# Patient Record
Sex: FEMALE | Race: WHITE | NOT HISPANIC OR LATINO | Employment: FULL TIME | URBAN - METROPOLITAN AREA
[De-identification: names, ages, dates, MRNs, and addresses within clinical notes are randomized per-mention and may not be internally consistent; named-entity substitution may affect disease eponyms.]

---

## 2017-03-09 ENCOUNTER — COMMUNICATION - HEALTHEAST (OUTPATIENT)
Dept: FAMILY MEDICINE | Facility: CLINIC | Age: 47
End: 2017-03-09

## 2017-09-05 ENCOUNTER — RECORDS - HEALTHEAST (OUTPATIENT)
Dept: ADMINISTRATIVE | Facility: OTHER | Age: 47
End: 2017-09-05

## 2018-01-03 ENCOUNTER — TRANSFERRED RECORDS (OUTPATIENT)
Dept: HEALTH INFORMATION MANAGEMENT | Facility: CLINIC | Age: 48
End: 2018-01-03

## 2018-01-03 ENCOUNTER — OFFICE VISIT - HEALTHEAST (OUTPATIENT)
Dept: FAMILY MEDICINE | Facility: CLINIC | Age: 48
End: 2018-01-03

## 2018-01-03 DIAGNOSIS — F32.A DEPRESSION, UNSPECIFIED DEPRESSION TYPE: ICD-10-CM

## 2018-01-03 DIAGNOSIS — M25.512 CHRONIC LEFT SHOULDER PAIN: ICD-10-CM

## 2018-01-03 DIAGNOSIS — G89.29 CHRONIC LEFT SHOULDER PAIN: ICD-10-CM

## 2018-01-03 DIAGNOSIS — Z00.00 ROUTINE GENERAL MEDICAL EXAMINATION AT A HEALTH CARE FACILITY: ICD-10-CM

## 2018-01-03 DIAGNOSIS — Z12.31 VISIT FOR SCREENING MAMMOGRAM: ICD-10-CM

## 2018-01-03 LAB
CHOLEST SERPL-MCNC: 189 MG/DL
FASTING STATUS PATIENT QL REPORTED: YES
FASTING STATUS PATIENT QL REPORTED: YES
GLUCOSE BLD-MCNC: 90 MG/DL (ref 70–99)
HDLC SERPL-MCNC: 83 MG/DL
HGB BLD-MCNC: 15.1 G/DL (ref 12–16)
HPV ABSTRACT: NORMAL
LDLC SERPL CALC-MCNC: 86 MG/DL
TRIGL SERPL-MCNC: 100 MG/DL

## 2018-01-03 ASSESSMENT — MIFFLIN-ST. JEOR: SCORE: 1504.57

## 2018-01-08 LAB
HUMAN PAPILLOMA VIRUS 16 DNA: NEGATIVE
HUMAN PAPILLOMA VIRUS 18 DNA: NEGATIVE
HUMAN PAPILLOMA VIRUS FINAL DIAGNOSIS: NORMAL
HUMAN PAPILLOMA VIRUS OTHER HR: NEGATIVE
SPECIMEN DESCRIPTION: NORMAL

## 2018-01-09 LAB
BKR LAB AP ABNORMAL BLEEDING: NO
BKR LAB AP BIRTH CONTROL/HORMONES: NORMAL
BKR LAB AP CERVICAL APPEARANCE: NORMAL
BKR LAB AP GYN ADEQUACY: NORMAL
BKR LAB AP GYN INTERPRETATION: NORMAL
BKR LAB AP HPV REFLEX: NORMAL
BKR LAB AP LMP: NORMAL
BKR LAB AP PATIENT STATUS: NORMAL
BKR LAB AP PREVIOUS ABNORMAL: NORMAL
BKR LAB AP PREVIOUS NORMAL: 2013
HIGH RISK?: NO
PATH REPORT.COMMENTS IMP SPEC: NORMAL
RESULT FLAG (HE HISTORICAL CONVERSION): NORMAL

## 2018-01-13 ENCOUNTER — HOSPITAL ENCOUNTER (OUTPATIENT)
Dept: MAMMOGRAPHY | Facility: HOSPITAL | Age: 48
Discharge: HOME | End: 2018-01-13
Attending: FAMILY MEDICINE

## 2018-01-13 DIAGNOSIS — Z12.31 VISIT FOR SCREENING MAMMOGRAM: ICD-10-CM

## 2018-01-13 LAB — PAP-ABSTRACT: NORMAL

## 2018-05-30 ENCOUNTER — COMMUNICATION - HEALTHEAST (OUTPATIENT)
Dept: SCHEDULING | Facility: CLINIC | Age: 48
End: 2018-05-30

## 2018-07-22 ENCOUNTER — COMMUNICATION - HEALTHEAST (OUTPATIENT)
Dept: FAMILY MEDICINE | Facility: CLINIC | Age: 48
End: 2018-07-22

## 2018-10-29 ENCOUNTER — COMMUNICATION - HEALTHEAST (OUTPATIENT)
Dept: FAMILY MEDICINE | Facility: CLINIC | Age: 48
End: 2018-10-29

## 2018-10-29 DIAGNOSIS — F32.A DEPRESSION, UNSPECIFIED DEPRESSION TYPE: ICD-10-CM

## 2019-01-23 ENCOUNTER — COMMUNICATION - HEALTHEAST (OUTPATIENT)
Dept: FAMILY MEDICINE | Facility: CLINIC | Age: 49
End: 2019-01-23

## 2019-01-23 ENCOUNTER — OFFICE VISIT - HEALTHEAST (OUTPATIENT)
Dept: FAMILY MEDICINE | Facility: CLINIC | Age: 49
End: 2019-01-23

## 2019-01-23 DIAGNOSIS — Z12.11 SCREEN FOR COLON CANCER: ICD-10-CM

## 2019-01-23 DIAGNOSIS — F32.A DEPRESSION, UNSPECIFIED DEPRESSION TYPE: ICD-10-CM

## 2019-01-23 DIAGNOSIS — Z00.00 HEALTHCARE MAINTENANCE: ICD-10-CM

## 2019-01-23 DIAGNOSIS — Z12.31 VISIT FOR SCREENING MAMMOGRAM: ICD-10-CM

## 2019-01-23 DIAGNOSIS — Z12.11 SCREENING FOR COLON CANCER: ICD-10-CM

## 2019-01-23 LAB
CHOLEST SERPL-MCNC: 164 MG/DL
FASTING STATUS PATIENT QL REPORTED: YES
HBA1C MFR BLD: 5.2 % (ref 3.5–6)
HDLC SERPL-MCNC: 73 MG/DL
HGB BLD-MCNC: 15 G/DL (ref 12–16)
LDLC SERPL CALC-MCNC: 72 MG/DL
TRIGL SERPL-MCNC: 97 MG/DL
TSH SERPL DL<=0.005 MIU/L-ACNC: 1.98 UIU/ML (ref 0.3–5)

## 2019-01-23 ASSESSMENT — MIFFLIN-ST. JEOR: SCORE: 1503.71

## 2019-01-30 ENCOUNTER — AMBULATORY - HEALTHEAST (OUTPATIENT)
Dept: NURSING | Facility: CLINIC | Age: 49
End: 2019-01-30

## 2019-02-06 ENCOUNTER — HOSPITAL ENCOUNTER (OUTPATIENT)
Dept: MAMMOGRAPHY | Facility: CLINIC | Age: 49
Discharge: HOME | End: 2019-02-06
Attending: FAMILY MEDICINE

## 2019-02-06 DIAGNOSIS — Z12.31 VISIT FOR SCREENING MAMMOGRAM: ICD-10-CM

## 2019-02-28 ASSESSMENT — MIFFLIN-ST. JEOR
SCORE: 1499.46
SCORE: 1499.46

## 2019-03-06 ENCOUNTER — ANESTHESIA - HEALTHEAST (OUTPATIENT)
Dept: SURGERY | Facility: AMBULATORY SURGERY CENTER | Age: 49
End: 2019-03-06

## 2019-03-07 ENCOUNTER — HOSPITAL ENCOUNTER (OUTPATIENT)
Dept: SURGERY | Facility: AMBULATORY SURGERY CENTER | Age: 49
Discharge: HOME | End: 2019-03-07
Attending: SURGERY | Admitting: SURGERY
Payer: COMMERCIAL

## 2019-03-07 ENCOUNTER — SURGERY - HEALTHEAST (OUTPATIENT)
Dept: SURGERY | Facility: AMBULATORY SURGERY CENTER | Age: 49
End: 2019-03-07

## 2019-03-07 ENCOUNTER — TRANSFERRED RECORDS (OUTPATIENT)
Dept: HEALTH INFORMATION MANAGEMENT | Facility: CLINIC | Age: 49
End: 2019-03-07

## 2019-03-07 LAB
DIPSTICK EXPIRATION DATE - HISTORICAL: NORMAL
DIPSTICK LOT NUMBER - HISTORICAL: NORMAL
POC PREG URINE (HCG) HE - HISTORICAL: NEGATIVE
POC SPECIFIC GRAVITY, URINE - HISTORICAL: NORMAL
POCT KIT EXPIRATION DATE - HISTORICAL: NORMAL
POCT KIT LOT NUMBER HE - HISTORICAL: NORMAL
POCT NEGATIVE CONTROL HE - HISTORICAL: NORMAL
POCT POSITIVE CONTROL HE - HISTORICAL: NORMAL

## 2019-03-07 ASSESSMENT — MIFFLIN-ST. JEOR
SCORE: 1499.46
SCORE: 1499.46

## 2019-04-03 ENCOUNTER — COMMUNICATION - HEALTHEAST (OUTPATIENT)
Dept: FAMILY MEDICINE | Facility: CLINIC | Age: 49
End: 2019-04-03

## 2019-04-03 ENCOUNTER — AMBULATORY - HEALTHEAST (OUTPATIENT)
Dept: NURSING | Facility: CLINIC | Age: 49
End: 2019-04-03

## 2019-04-03 DIAGNOSIS — Z00.00 HEALTHCARE MAINTENANCE: ICD-10-CM

## 2019-05-31 ENCOUNTER — COMMUNICATION - HEALTHEAST (OUTPATIENT)
Dept: FAMILY MEDICINE | Facility: CLINIC | Age: 49
End: 2019-05-31

## 2019-06-12 ENCOUNTER — OFFICE VISIT - HEALTHEAST (OUTPATIENT)
Dept: FAMILY MEDICINE | Facility: CLINIC | Age: 49
End: 2019-06-12

## 2019-06-12 DIAGNOSIS — D22.9 BENIGN NEVUS: ICD-10-CM

## 2019-06-12 DIAGNOSIS — S93.402A SPRAIN OF LEFT ANKLE, UNSPECIFIED LIGAMENT, INITIAL ENCOUNTER: ICD-10-CM

## 2019-06-12 ASSESSMENT — MIFFLIN-ST. JEOR: SCORE: 1519.03

## 2020-02-18 ENCOUNTER — OFFICE VISIT - HEALTHEAST (OUTPATIENT)
Dept: FAMILY MEDICINE | Facility: CLINIC | Age: 50
End: 2020-02-18

## 2020-02-18 DIAGNOSIS — Z12.31 VISIT FOR SCREENING MAMMOGRAM: ICD-10-CM

## 2020-02-18 DIAGNOSIS — F32.A DEPRESSION, UNSPECIFIED DEPRESSION TYPE: ICD-10-CM

## 2020-02-18 DIAGNOSIS — Z00.00 ROUTINE GENERAL MEDICAL EXAMINATION AT A HEALTH CARE FACILITY: ICD-10-CM

## 2020-02-18 DIAGNOSIS — M79.645 PAIN OF FINGER OF LEFT HAND: ICD-10-CM

## 2020-02-18 LAB
CHOLEST SERPL-MCNC: 158 MG/DL
FASTING STATUS PATIENT QL REPORTED: YES
FASTING STATUS PATIENT QL REPORTED: YES
GLUCOSE BLD-MCNC: 99 MG/DL (ref 70–99)
HDLC SERPL-MCNC: 71 MG/DL
LDLC SERPL CALC-MCNC: 72 MG/DL
TRIGL SERPL-MCNC: 74 MG/DL

## 2020-02-18 ASSESSMENT — ANXIETY QUESTIONNAIRES
6. BECOMING EASILY ANNOYED OR IRRITABLE: MORE THAN HALF THE DAYS
2. NOT BEING ABLE TO STOP OR CONTROL WORRYING: NOT AT ALL
IF YOU CHECKED OFF ANY PROBLEMS ON THIS QUESTIONNAIRE, HOW DIFFICULT HAVE THESE PROBLEMS MADE IT FOR YOU TO DO YOUR WORK, TAKE CARE OF THINGS AT HOME, OR GET ALONG WITH OTHER PEOPLE: NOT DIFFICULT AT ALL
4. TROUBLE RELAXING: NOT AT ALL
7. FEELING AFRAID AS IF SOMETHING AWFUL MIGHT HAPPEN: NOT AT ALL
GAD7 TOTAL SCORE: 3
5. BEING SO RESTLESS THAT IT IS HARD TO SIT STILL: NOT AT ALL
3. WORRYING TOO MUCH ABOUT DIFFERENT THINGS: NOT AT ALL
1. FEELING NERVOUS, ANXIOUS, OR ON EDGE: SEVERAL DAYS

## 2020-02-18 ASSESSMENT — PATIENT HEALTH QUESTIONNAIRE - PHQ9: SUM OF ALL RESPONSES TO PHQ QUESTIONS 1-9: 7

## 2020-02-18 ASSESSMENT — MIFFLIN-ST. JEOR: SCORE: 1510.24

## 2020-06-08 ENCOUNTER — HOSPITAL ENCOUNTER (OUTPATIENT)
Dept: MAMMOGRAPHY | Facility: CLINIC | Age: 50
Discharge: HOME | End: 2020-06-08
Attending: FAMILY MEDICINE

## 2020-06-08 ENCOUNTER — TRANSFERRED RECORDS (OUTPATIENT)
Dept: HEALTH INFORMATION MANAGEMENT | Facility: CLINIC | Age: 50
End: 2020-06-08

## 2020-06-08 DIAGNOSIS — Z12.31 VISIT FOR SCREENING MAMMOGRAM: ICD-10-CM

## 2020-07-07 ENCOUNTER — VIRTUAL VISIT (OUTPATIENT)
Dept: FAMILY MEDICINE | Facility: OTHER | Age: 50
End: 2020-07-07

## 2020-07-08 ENCOUNTER — AMBULATORY - HEALTHEAST (OUTPATIENT)
Dept: FAMILY MEDICINE | Facility: CLINIC | Age: 50
End: 2020-07-08

## 2020-07-08 DIAGNOSIS — Z20.822 SUSPECTED COVID-19 VIRUS INFECTION: ICD-10-CM

## 2020-07-09 NOTE — PROGRESS NOTES
"Date: 2020 08:58:22  Clinician: Gerber Barajas  Clinician NPI: [id number]  Patient: Myrna Bernstein  Patient : 1970  Patient Address: [address], [place] [address]  Patient Phone: [phone]  Visit Protocol: URI  Patient Summary:  Myrna is a 50 year old ( : 1970 ) female who initiated a Visit for COVID-19 (Coronavirus) evaluation and screening. When asked the question \"Please sign me up to receive news, health information and promotions from [employer].\", Myrna responded \"No\".    Myrna states her symptoms started gradually 3-4 days ago. After her symptoms started, they improved and then got worse again.   Her symptoms consist of malaise, a headache, and chills. Myrna also feels feverish.   Symptom details     Temperature: Her current temperature is 99.8 degrees Fahrenheit.     Headache: She states the headache is mild (1-3 on a 10 point pain scale).      Myrna denies having wheezing, nausea, teeth pain, ageusia, diarrhea, vomiting, rhinitis, ear pain, sore throat, myalgias, anosmia, facial pain or pressure, cough, and nasal congestion. She also denies having recent facial or sinus surgery in the past 60 days and taking antibiotic medication in the past month. She is not experiencing dyspnea.   Precipitating events  She has not recently been exposed to someone with influenza. Myrna has been in close contact with the following high risk individuals: children under the age of 5.   Pertinent COVID-19 (Coronavirus) information  In the past 14 days, Myrna has not worked in a congregate living setting.   She does not work or volunteer as healthcare worker or a  and does not work or volunteer in a healthcare facility.   Myrna also has not lived in a congregate living setting in the past 14 days. She does not live with a healthcare worker.   Myrna has not had a close contact with a laboratory-confirmed COVID-19 patient within 14 days of symptom onset.   Pertinent medical history  " Myrna had 1 sinus infection within the past year.   Myrna does not get yeast infections when she takes antibiotics.   Myrna needs a return to work/school note.   Weight: 190 lbs   Myrna does not smoke or use smokeless tobacco.   Additional information as reported by the patient (free text): About 2 weeks ago, the left side of my neck was sore- the lymph nodes did not feel enlarged but very tender, that moved to the right side a few days ago. My first temp was Sat. night 100, fine in morning. Sunday evening temp 100.7, Monday morning fine. Monday evening 100, this morning varies between 99- 99.9, slight headache   Weight: 190 lbs    MEDICATIONS: venlafaxine oral, ALLERGIES: erythromycin base  Clinician Response:  Dear Myrna,   Your symptoms show that you may have coronavirus (COVID-19). This illness can cause fever, cough and trouble breathing. Many people get a mild case and get better on their own. Some people can get very sick.  What should I do?  We would like to test you for this virus.   1. Please call [phone] to schedule your visit. Explain that you were referred by [facility] to have a COVID-19 test. Be [facility].  The following will serve as your written order for this COVID Test, ordered by me, for the indication of suspected COVID [Z20.828]: The test will be ordered in [employer], our electronic health record, after you are scheduled. It will show as ordered and authorized by Jackson Reeder MD.  Order: COVID-19 (Coronavirus) PCR for SYMPTOMATIC testing from [facility].      2. When it's time for your COVID test:  Stay at least 6 feet away from others. (If someone will drive you to your test, stay in the backseat, as far away from the  as you can.)   Cover your mouth and nose with a mask, tissue or washcloth.  Go straight to the testing site. Don't make any stops on the way there or back.      3.Starting now: Stay home and away from others (self-isolate) until:   You've had  "no fever---and no medicine that reduces fever---for 3 full days (72 hours). And...   Your other symptoms have gotten better. For example, your cough or breathing has improved. And...   At least 10 days have passed since your symptoms started.       During this time, don't leave the house except for testing or medical care.   Stay in your own room, even for meals. Use your own bathroom if you can.   Stay away from others in your home. No hugging, kissing or shaking hands. No visitors.  Don't go to work, school or anywhere else.    Clean \"high touch\" surfaces often (doorknobs, counters, handles, etc.). Use a household cleaning spray or wipes. You'll find a full list of  on the EPA website: www.epa.gov/pesticide-registration/list-n-disinfectants-use-against-sars-cov-2.   Cover your mouth and nose with a mask, tissue or washcloth to avoid spreading germs.  Wash your hands and face often. Use soap and water.  Caregivers in these groups are at risk for severe illness due to COVID-19:  o People 65 years and older  o People who live in a nursing home or long-term care facility  o People with chronic disease (lung, heart, cancer, diabetes, kidney, liver, immunologic)  o People who have a weakened immune system, including those who:   Are in cancer treatment  Take medicine that weakens the immune system, such as corticosteroids  Had a bone marrow or organ transplant  Have an immune deficiency  Have poorly controlled HIV or AIDS  Are obese (body mass index of 40 or higher)  Smoke regularly   o Caregivers should wear gloves while washing dishes, handling laundry and cleaning bedrooms and bathrooms.  o Use caution when washing and drying laundry: Don't shake dirty laundry, and use the warmest water setting that you can.  o For more tips, go to www.cdc.gov/coronavirus/2019-ncov/downloads/10Things.pdf.    4.Sign up for [employer] [facility]. We know it's scary to hear that you might have COVID-19. We want to track your symptoms to " make sure you're okay over the next 2 weeks. Please look for an email from [employer]---this is a free, online program that we'll use to keep in touch. To sign up, follow the link in the email. Learn more at [email or web address]  How can I take care of myself?   Get lots of rest. Drink extra fluids (unless a doctor has told you not to).   Take Tylenol (acetaminophen) for fever or pain. If you have liver or kidney problems, ask your family doctor if it's okay to take Tylenol.   Adults can take either:    650 mg (two 325 mg pills) every 4 to 6 hours, or...   1,000 mg (two 500 mg pills) every 8 hours as needed.    Note: Don't take more than 3,000 mg in one day. Acetaminophen is found in many medicines (both prescribed and over-the-counter medicines). Read all labels to be sure you don't take too much.   For children, check the Tylenol bottle for the right dose. The dose is based on the child's age or weight.    If you have other health problems (like cancer, heart failure, an organ transplant or severe kidney disease): Call your specialty clinic if you don't feel better in the next 2 days.       Know when to call 911. Emergency warning signs include:    Trouble breathing or shortness of breath Pain or pressure in the chest that doesn't go away Feeling confused like you haven't felt before, or not being able to wake up Bluish-colored lips or face.  Where can I get more information?   [facility] -- About COVID-19: [email or web address]   CDC -- What to Do If You're Sick: www.cdc.gov/coronavirus/2019-ncov/about/steps-when-sick.html   CDC -- Ending Home Isolation: www.cdc.gov/coronavirus/2019-ncov/hcp/disposition-in-home-patients.html   CDC -- Caring for Someone: www.cdc.gov/coronavirus/2019-ncov/if-you-are-sick/care-for-someone.html   [facility] -- Interim Guidance for Hospital Discharge to Home: www.health.[facility].[place].us/diseases/coronavirus/hcp/hospdischarge.pdf   [facility]  trials (COVID-19 research studies): [email or web address].[email or web address]/n-clinical-trials    Below are the COVID-19 hotlines at the [place] Department of Health ([place]). Interpreters are available.    For health questions: Call 752-357-1743 or 1-246.338.5763 (7 a.m. to 7 p.m.) For questions about schools and childcare: Call 496-767-6418 or 1-938.210.9879 (7 a.m. to 7 p.m.)    Diagnosis: Other malaise  Diagnosis ICD: R53.81

## 2020-07-13 ENCOUNTER — COMMUNICATION - HEALTHEAST (OUTPATIENT)
Dept: FAMILY MEDICINE | Facility: CLINIC | Age: 50
End: 2020-07-13

## 2020-07-13 ENCOUNTER — COMMUNICATION - HEALTHEAST (OUTPATIENT)
Dept: SCHEDULING | Facility: CLINIC | Age: 50
End: 2020-07-13

## 2020-07-13 ENCOUNTER — OFFICE VISIT - HEALTHEAST (OUTPATIENT)
Dept: FAMILY MEDICINE | Facility: CLINIC | Age: 50
End: 2020-07-13

## 2020-07-13 DIAGNOSIS — R50.9 FEVER, UNSPECIFIED FEVER CAUSE: ICD-10-CM

## 2020-07-13 DIAGNOSIS — R59.1 LYMPHADENOPATHY: ICD-10-CM

## 2020-07-18 ENCOUNTER — AMBULATORY - HEALTHEAST (OUTPATIENT)
Dept: FAMILY MEDICINE | Facility: CLINIC | Age: 50
End: 2020-07-18

## 2020-07-18 DIAGNOSIS — R50.9 FEVER, UNSPECIFIED FEVER CAUSE: ICD-10-CM

## 2020-07-18 DIAGNOSIS — R59.1 LYMPHADENOPATHY: ICD-10-CM

## 2020-07-23 ENCOUNTER — COMMUNICATION - HEALTHEAST (OUTPATIENT)
Dept: FAMILY MEDICINE | Facility: CLINIC | Age: 50
End: 2020-07-23

## 2020-08-05 ENCOUNTER — OFFICE VISIT - HEALTHEAST (OUTPATIENT)
Dept: FAMILY MEDICINE | Facility: CLINIC | Age: 50
End: 2020-08-05

## 2020-08-05 DIAGNOSIS — M79.672 LEFT FOOT PAIN: ICD-10-CM

## 2020-08-05 ASSESSMENT — MIFFLIN-ST. JEOR: SCORE: 1497.65

## 2020-08-24 ENCOUNTER — OFFICE VISIT - HEALTHEAST (OUTPATIENT)
Dept: PODIATRY | Facility: CLINIC | Age: 50
End: 2020-08-24

## 2020-08-24 DIAGNOSIS — M76.72 PERONEAL TENDINITIS OF LEFT LOWER EXTREMITY: ICD-10-CM

## 2021-02-05 ENCOUNTER — OFFICE VISIT - HEALTHEAST (OUTPATIENT)
Dept: PODIATRY | Facility: CLINIC | Age: 51
End: 2021-02-05

## 2021-02-05 DIAGNOSIS — M24.573 EQUINUS CONTRACTURE OF ANKLE: ICD-10-CM

## 2021-02-05 DIAGNOSIS — M72.2 PLANTAR FASCIITIS: ICD-10-CM

## 2021-03-01 ENCOUNTER — OFFICE VISIT (OUTPATIENT)
Dept: FAMILY MEDICINE | Facility: CLINIC | Age: 51
End: 2021-03-01
Payer: COMMERCIAL

## 2021-03-01 ENCOUNTER — RECORDS - HEALTHEAST (OUTPATIENT)
Dept: ADMINISTRATIVE | Facility: OTHER | Age: 51
End: 2021-03-01

## 2021-03-01 ENCOUNTER — RECORDS - HEALTHEAST (OUTPATIENT)
Dept: SCHEDULING | Facility: CLINIC | Age: 51
End: 2021-03-01

## 2021-03-01 VITALS
WEIGHT: 194 LBS | SYSTOLIC BLOOD PRESSURE: 118 MMHG | DIASTOLIC BLOOD PRESSURE: 68 MMHG | RESPIRATION RATE: 16 BRPM | BODY MASS INDEX: 30.45 KG/M2 | HEIGHT: 67 IN | TEMPERATURE: 98.1 F | HEART RATE: 76 BPM

## 2021-03-01 DIAGNOSIS — F41.9 ANXIETY: ICD-10-CM

## 2021-03-01 DIAGNOSIS — Z00.00 HEALTHCARE MAINTENANCE: Primary | ICD-10-CM

## 2021-03-01 DIAGNOSIS — Z00.00 HEALTHCARE MAINTENANCE: ICD-10-CM

## 2021-03-01 LAB
ANION GAP SERPL CALCULATED.3IONS-SCNC: 4 MMOL/L (ref 3–14)
BUN SERPL-MCNC: 12 MG/DL (ref 7–30)
CALCIUM SERPL-MCNC: 8.9 MG/DL (ref 8.5–10.1)
CHLORIDE SERPL-SCNC: 107 MMOL/L (ref 94–109)
CHOLEST SERPL-MCNC: 181 MG/DL
CO2 SERPL-SCNC: 27 MMOL/L (ref 20–32)
CREAT SERPL-MCNC: 0.84 MG/DL (ref 0.52–1.04)
GFR SERPL CREATININE-BSD FRML MDRD: 79 ML/MIN/{1.73_M2}
GLUCOSE SERPL-MCNC: 96 MG/DL (ref 70–99)
HDLC SERPL-MCNC: 94 MG/DL
HGB BLD-MCNC: 14 G/DL (ref 11.7–15.7)
LDLC SERPL CALC-MCNC: 71 MG/DL
NONHDLC SERPL-MCNC: 87 MG/DL
POTASSIUM SERPL-SCNC: 4 MMOL/L (ref 3.4–5.3)
SODIUM SERPL-SCNC: 138 MMOL/L (ref 133–144)
T4 FREE SERPL-MCNC: 0.81 NG/DL (ref 0.76–1.46)
TRIGL SERPL-MCNC: 78 MG/DL
TSH SERPL DL<=0.005 MIU/L-ACNC: 5.45 MU/L (ref 0.4–4)

## 2021-03-01 PROCEDURE — 80061 LIPID PANEL: CPT | Performed by: FAMILY MEDICINE

## 2021-03-01 PROCEDURE — 85018 HEMOGLOBIN: CPT | Performed by: FAMILY MEDICINE

## 2021-03-01 PROCEDURE — 84439 ASSAY OF FREE THYROXINE: CPT | Performed by: FAMILY MEDICINE

## 2021-03-01 PROCEDURE — 36415 COLL VENOUS BLD VENIPUNCTURE: CPT | Performed by: FAMILY MEDICINE

## 2021-03-01 PROCEDURE — 99396 PREV VISIT EST AGE 40-64: CPT | Performed by: FAMILY MEDICINE

## 2021-03-01 PROCEDURE — 80048 BASIC METABOLIC PNL TOTAL CA: CPT | Performed by: FAMILY MEDICINE

## 2021-03-01 PROCEDURE — 84443 ASSAY THYROID STIM HORMONE: CPT | Performed by: FAMILY MEDICINE

## 2021-03-01 RX ORDER — VENLAFAXINE HYDROCHLORIDE 37.5 MG/1
37.5 CAPSULE, EXTENDED RELEASE ORAL DAILY
Qty: 90 CAPSULE | Refills: 3 | Status: SHIPPED | OUTPATIENT
Start: 2021-03-01 | End: 2022-01-25

## 2021-03-01 RX ORDER — VENLAFAXINE HYDROCHLORIDE 37.5 MG/1
37.5 CAPSULE, EXTENDED RELEASE ORAL
COMMUNITY
Start: 2020-02-18 | End: 2021-03-01

## 2021-03-01 RX ORDER — RIBOFLAVIN (VITAMIN B2) 100 MG
1 TABLET ORAL
COMMUNITY
End: 2023-12-20

## 2021-03-01 ASSESSMENT — PATIENT HEALTH QUESTIONNAIRE - PHQ9: 5. POOR APPETITE OR OVEREATING: NOT AT ALL

## 2021-03-01 ASSESSMENT — ANXIETY QUESTIONNAIRES
6. BECOMING EASILY ANNOYED OR IRRITABLE: MORE THAN HALF THE DAYS
7. FEELING AFRAID AS IF SOMETHING AWFUL MIGHT HAPPEN: NOT AT ALL
5. BEING SO RESTLESS THAT IT IS HARD TO SIT STILL: NOT AT ALL
GAD7 TOTAL SCORE: 3
2. NOT BEING ABLE TO STOP OR CONTROL WORRYING: NOT AT ALL
1. FEELING NERVOUS, ANXIOUS, OR ON EDGE: SEVERAL DAYS
3. WORRYING TOO MUCH ABOUT DIFFERENT THINGS: NOT AT ALL
IF YOU CHECKED OFF ANY PROBLEMS ON THIS QUESTIONNAIRE, HOW DIFFICULT HAVE THESE PROBLEMS MADE IT FOR YOU TO DO YOUR WORK, TAKE CARE OF THINGS AT HOME, OR GET ALONG WITH OTHER PEOPLE: SOMEWHAT DIFFICULT

## 2021-03-01 ASSESSMENT — MIFFLIN-ST. JEOR: SCORE: 1515.22

## 2021-03-01 NOTE — PROGRESS NOTES
"    Assessment/Plan:     Health maintenance female exam.  All questions answered.  Await pap smear results.  Breast self exam technique reviewed and patient encouraged to perform self-exam monthly.  Discussed healthy lifestyle modifications.  Mammogram ordered.  Await fasting lab results    BMI:   Estimated body mass index is 30.81 kg/m  as calculated from the following:    Height as of this encounter: 1.69 m (5' 6.54\").    Weight as of this encounter: 88 kg (194 lb).   Weight management plan: Discussed healthy diet and exercise guidelines      Healthcare maintenance  - *MA Screening Digital Bilateral  - Hemoglobin  - Basic metabolic panel  - Lipid panel reflex to direct LDL Fasting  - TSH with free T4 reflex    Anxiety  Refill -does not feel as though she needs to increase at this time.  - venlafaxine (EFFEXOR-XR) 37.5 MG 24 hr capsule  Dispense: 90 capsule; Refill: 3  - T4 free              Subjective:     Myrna Bernstein is a 50 year old female who presents for an annual exam.  She is overall doing well.    Her father passed away about a year ago from Alzheimer's.  Her mother passed away in just the past few months.  This has been difficult for her but she was able to spend significant time with her mother in the last 2 weeks before she passed away.    Patient is currently working as a  provider.  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      Healthy Habits:   Regular Exercise: Yes  Sunscreen Use: Yes  Healthy Diet: yes  Dental Visits Regularly: yes  Seat Belt: Yes  Self Breast Exam Monthly: yes  Colonoscopy: yes  Prevention of Osteoporosis: yes  Last Dexa: n/a    Immunization History   Administered Date(s) Administered     COVID-19,PF,Moderna 02/05/2021     Flu, Unspecified 10/15/2015, 01/03/2018     HepA-Adult 10/08/2007, 04/04/2008     Influenza (IIV3) PF 10/19/2006, 10/08/2007, 11/19/2008, 09/14/2009, 10/22/2010, 09/12/2011, 10/04/2012, 10/16/2013, 10/13/2014     Influenza Vaccine IM > 6 months Valent IIV4 01/23/2019 "     TDAP Vaccine (Boostrix) 10/08/2007, 01/03/2018     Td (Adult), Adsorbed 01/01/1997     Zoster vaccine recombinant adjuvanted (SHINGRIX) 01/30/2019, 04/03/2019         Gynecologic History  Patient's last menstrual period was 03/01/2021 (exact date).  Contraception: menopausal  Last Pap: 2018. Results were: normal  Last mammogram:6/8/20 . Results were: normal      OB History   No obstetric history on file.       Current Outpatient Medications   Medication Sig Dispense Refill     glucosamine-chondroitinoitin 500-400 MG tablet Take 1 tablet by mouth       Multiple Vitamin (MULTIVITAMIN PO)        venlafaxine (EFFEXOR-XR) 37.5 MG 24 hr capsule Take 1 capsule (37.5 mg) by mouth daily 90 capsule 3     No past medical history on file.  No past surgical history on file.  Erythromycin  No family history on file.  Social History     Socioeconomic History     Marital status:      Spouse name: Not on file     Number of children: Not on file     Years of education: Not on file     Highest education level: Not on file   Occupational History     Not on file   Social Needs     Financial resource strain: Not on file     Food insecurity     Worry: Not on file     Inability: Not on file     Transportation needs     Medical: Not on file     Non-medical: Not on file   Tobacco Use     Smoking status: Never Smoker     Smokeless tobacco: Never Used   Substance and Sexual Activity     Alcohol use: Not on file     Drug use: Not on file     Sexual activity: Not on file   Lifestyle     Physical activity     Days per week: Not on file     Minutes per session: Not on file     Stress: Not on file   Relationships     Social connections     Talks on phone: Not on file     Gets together: Not on file     Attends Orthodoxy service: Not on file     Active member of club or organization: Not on file     Attends meetings of clubs or organizations: Not on file     Relationship status: Not on file     Intimate partner violence     Fear of current  "or ex partner: Not on file     Emotionally abused: Not on file     Physically abused: Not on file     Forced sexual activity: Not on file   Other Topics Concern     Not on file   Social History Narrative     Not on file       Review of Systems  12 point review of systems was completed and found to be negative except for what is been stated above.      Objective:      Vitals:    03/01/21 0828   BP: 118/68   Pulse: 76   Resp: 16   Temp: 98.1  F (36.7  C)   TempSrc: Tympanic   Weight: 88 kg (194 lb)   Height: 1.69 m (5' 6.54\")         Physical Exam:  General Appearance: Alert, cooperative, no distress, appears stated age   Head: Normocephalic, without obvious abnormality, atraumatic  Eyes: PERRL, conjunctiva/corneas clear, EOM's intact   Ears: Normal TM's and external ear canals, both ears  Neck: Supple, symmetrical, trachea midline, no adenopathy;  thyroid: not enlarged, symmetric, no tenderness/mass/nodules  Back: Symmetric, no curvature, ROM normal,  Lungs: Clear to auscultation bilaterally, respirations unlabored  Breasts: No breast masses, tenderness, asymmetry, or nipple discharge.  Heart: Regular rate and rhythm, S1 and S2 normal, no murmur, rub, or gallop  Abdomen: Soft, non-tender, bowel sounds active all four quadrants,  no masses, no organomegaly  Extremities: Extremities normal, atraumatic, no cyanosis or edema  Skin: Skin color, texture, turgor normal, no rashes or lesions  Lymph nodes: Cervical, supraclavicular, and axillary nodes normal and   Neurologic: Normal     "

## 2021-03-02 ASSESSMENT — ANXIETY QUESTIONNAIRES: GAD7 TOTAL SCORE: 3

## 2021-03-07 ENCOUNTER — HEALTH MAINTENANCE LETTER (OUTPATIENT)
Age: 51
End: 2021-03-07

## 2021-05-27 VITALS
SYSTOLIC BLOOD PRESSURE: 116 MMHG | TEMPERATURE: 97 F | DIASTOLIC BLOOD PRESSURE: 68 MMHG | HEART RATE: 84 BPM | RESPIRATION RATE: 16 BRPM

## 2021-05-27 ASSESSMENT — PATIENT HEALTH QUESTIONNAIRE - PHQ9: SUM OF ALL RESPONSES TO PHQ QUESTIONS 1-9: 7

## 2021-05-27 NOTE — TELEPHONE ENCOUNTER
New Appointment Needed  What is the reason for the visit:    2nd dose shingles shot  Provider Preference: Any available  How soon do you need to be seen?: Anytime later in the afternoon is best.  Waitlist offered?: No  Okay to leave a detailed message:  Yes

## 2021-05-28 ENCOUNTER — RECORDS - HEALTHEAST (OUTPATIENT)
Dept: ADMINISTRATIVE | Facility: CLINIC | Age: 51
End: 2021-05-28

## 2021-05-28 ASSESSMENT — ANXIETY QUESTIONNAIRES: GAD7 TOTAL SCORE: 3

## 2021-05-29 ENCOUNTER — RECORDS - HEALTHEAST (OUTPATIENT)
Dept: ADMINISTRATIVE | Facility: CLINIC | Age: 51
End: 2021-05-29

## 2021-05-29 NOTE — PROGRESS NOTES
Assessment/Plan:    Myrna Bernstein is a 49 y.o. female presenting for:    1. Benign nevus  Reassurance given to the patient that these are benign lesions.  She will contact me if they are bothersome at which point we could certainly remove them but I suspect that the scar tissue would be more noticeable than the actual mole.    2. Sprain of left ankle, unspecified ligament, initial encounter  Physical therapy exercises were given.  I did offer formal physical therapy and she will consider this.  She will let me know how things go over the next few weeks.        There are no discontinued medications.        Chief Complaint:  Chief Complaint   Patient presents with     Nevus     Mole on L hand and R collarbone      Ankle Pain     L ankle pain and R ankle has a small mass       Subjective:   Myrna Bernstein is a pleasant 49-year-old female presenting to the clinic today for several concerns:    1.  Nevus: Patient has noted 1 small mole on her left dorsal hand as well as one small mole on her right neck that she would like looked at today.  They do not bother her.  They are not itchy.  They do not bleed.  They have not really been changing in the last few months since she is watching them.    2.  Ankle sprain: About 2-1/2 months ago the patient slipped on some ice outside.  She sprained her ankle.  She states that it was fairly swollen originally but that has gone down.  She continues to have some ankle pain when she crosses her legs.  No pain with walking.  No pain when sitting regularly but when sitting cross ligated and putting pressure on the lateral aspect of her ankle it is somewhat painful.  No further swelling.    12 point review of systems completed and negative except for what has been described above    Social History     Tobacco Use   Smoking Status Never Smoker   Smokeless Tobacco Never Used       Current Outpatient Medications   Medication Sig     venlafaxine (EFFEXOR-XR) 37.5 MG 24 hr capsule Take 1  "capsule (37.5 mg total) by mouth daily.         Objective:  Vitals:    06/12/19 1441   BP: 128/64   Pulse: 84   Resp: 16   Temp: 98.5  F (36.9  C)   TempSrc: Oral   Weight: 194 lb 5 oz (88.1 kg)   Height: 5' 7\" (1.702 m)       Body mass index is 30.43 kg/m .    Vital signs reviewed and stable  General: No acute distress  Psych: Appropriate affect  HEENT: moist mucous membranes  Cardiovascular: regular rate and rhythm with no murmur  Pulmonary: clear to auscultation bilaterally with no wheeze  Abdomen: soft, non tender, non distended with normo-active bowel sounds  Extremities: warm and well perfused with no edema  Skin: warm and dry with no rash, very small slightly well-demarcated nevus on patient's left posterior hand as well as right neck.  No concern for malignancy.    Musculoskeletal: No tenderness to palpation over the left ankle.  Normal strength and range of motion of ankle.         This note has been dictated and transcribed using voice recognition software.   Any errors in transcription are unintentional and inherent to the software.  "

## 2021-05-29 NOTE — TELEPHONE ENCOUNTER
----- Message from Carmel Schumacher MD sent at 1/23/2019  8:34 AM CST -----  Regarding: colonoscopy  Contact to see if she would like to schedule a colonoscopy

## 2021-05-29 NOTE — TELEPHONE ENCOUNTER
Can you please contact her and give her the info to schedule a colonoscopy (she had mentioned that she would like to get it done in the spring/summer) and que up the order to cosign if she agrees    Thanks    BB

## 2021-05-30 NOTE — TELEPHONE ENCOUNTER
Spoke to pt and she states that she had her colonoscopy on 3/7/2019. Results are in pt's chart scanned under surgeries. Did send a copy to get scanned under procedures for easier reference. Completing task.

## 2021-05-31 VITALS — WEIGHT: 192 LBS | BODY MASS INDEX: 30.13 KG/M2 | HEIGHT: 67 IN

## 2021-06-02 VITALS
WEIGHT: 190 LBS | HEIGHT: 67 IN | BODY MASS INDEX: 29.82 KG/M2 | HEIGHT: 67 IN | WEIGHT: 190 LBS | BODY MASS INDEX: 29.82 KG/M2

## 2021-06-02 VITALS — BODY MASS INDEX: 29.83 KG/M2 | WEIGHT: 190.06 LBS | HEIGHT: 67 IN

## 2021-06-03 VITALS — HEIGHT: 67 IN | WEIGHT: 194.31 LBS | BODY MASS INDEX: 30.5 KG/M2

## 2021-06-04 VITALS
DIASTOLIC BLOOD PRESSURE: 62 MMHG | HEART RATE: 72 BPM | BODY MASS INDEX: 30.19 KG/M2 | TEMPERATURE: 98 F | HEIGHT: 67 IN | WEIGHT: 192.38 LBS | SYSTOLIC BLOOD PRESSURE: 110 MMHG | RESPIRATION RATE: 16 BRPM

## 2021-06-04 VITALS
BODY MASS INDEX: 29.76 KG/M2 | HEIGHT: 67 IN | DIASTOLIC BLOOD PRESSURE: 77 MMHG | HEART RATE: 86 BPM | SYSTOLIC BLOOD PRESSURE: 130 MMHG | WEIGHT: 189.6 LBS | OXYGEN SATURATION: 98 %

## 2021-06-06 NOTE — PROGRESS NOTES
Assessment/Plan:     Health maintenance female exam.  All questions answered.  Breast self exam technique reviewed and patient encouraged to perform self-exam monthly.  Discussed healthy lifestyle modifications.  Mammogram ordered.  Await fasting lab results  The following high BMI interventions were performed this visit: encouragement to exercise and weight monitoring    1. Routine general medical examination at a health care facility  - Lipid Cascade  - Glucose    2. Depression, unspecified depression type  Doing well overall - some stressful events over the last month but doing well overall  - venlafaxine (EFFEXOR-XR) 37.5 MG 24 hr capsule; Take 1 capsule (37.5 mg total) by mouth daily.  Dispense: 90 capsule; Refill: 3    3. Pain of finger of left hand  I did offer to do an x-ray today however I think it might be better to get her into orthopedics for further evaluation.  - Ambulatory referral to Orthopedics    4. Visit for screening mammogram  - Mammo Screening Bilateral; Future          Subjective:      Myrna Bernstein is a 49 y.o. female who presents for an annual exam.  She is overall doing fairly well.    Unfortunately her father, who had severe Alzheimer's, passed away about 4 weeks ago.  She unfortunately had to put her dog to sleep a few weeks ago as well.  She has a good support system.  Her mother is still living in assisted living.    Patient also notes that she has been having some pain in her left pointer finger.  This is in the PIP joint.  There is no swelling.  There is no trauma.  This is fairly consistent for the last two months.    Healthy Habits:   Regular Exercise: Yes  Sunscreen Use: Yes  Healthy Diet: Yes  Dental Visits Regularly: Yes  Seat Belt: Yes  Sexually active: Yes  Self Breast Exam Monthly:No  Colonoscopy: Yes  Prevention of Osteoporosis: Yes  Last Dexa: N/A    Immunization History   Administered Date(s) Administered     Hep A, historic 10/08/2007, 04/04/2008     Influenza, inj,  historic,unspecified 10/08/2007, 2009, 10/04/2012     Influenza, seasonal,quad inj 6-35 mos 2008, 10/22/2010, 2011, 10/16/2013, 10/13/2014     Influenza,seasonal quad, PF, =/> 6months 2019     Influenza,seasonal,quad inj =/> 6months 10/15/2015, 2018     Td,adult,historic,unspecified 1997     Tdap 10/08/2007, 2018     ZOSTER, RECOMBINANT, IM 2019, 2019     Immunization status: up to date and documented.    Gynecologic History  Patient's last menstrual period was 2020 (within days).  Contraception: post menopausal status  Last Pap: . Results were: normal  Last mammogram: . Results were: normal      OB History    Para Term  AB Living   3 3 3         SAB TAB Ectopic Multiple Live Births                  # Outcome Date GA Lbr Ang/2nd Weight Sex Delivery Anes PTL Lv   3 Term            2 Term            1 Term                Current Outpatient Medications   Medication Sig Dispense Refill     CHOLINE ORAL Take 250 mg by mouth daily.       venlafaxine (EFFEXOR-XR) 37.5 MG 24 hr capsule Take 1 capsule (37.5 mg total) by mouth daily. 90 capsule 3     No current facility-administered medications for this visit.      Past Medical History:   Diagnosis Date     Anxiety      Arthritis      Depression      Past Surgical History:   Procedure Laterality Date     COLONOSCOPY N/A 3/7/2019    Procedure: COLONOSCOPY;  Surgeon: Jenny Edward DO;  Location: [facility] OR;  Service: General     [facility]  Family History   Problem Relation Age of Onset     Diabetes Mother      Myasthenia gravis Mother      Hypertension Mother      Hypothyroidism Father      Hypertension Father      Dementia Father      Prostate cancer Father      Diabetes Brother      Breast cancer Maternal Grandmother      Social History     Socioeconomic History     Marital status:      Spouse name: Not on file     Number of children: Not on file     Years of education:  "Not on file     Highest education level: Not on file   Occupational History     Not on file   Social Needs     Financial resource strain: Not on file     Food insecurity:     Worry: Not on file     Inability: Not on file     Transportation needs:     Medical: Not on file     Non-medical: Not on file   Tobacco Use     Smoking status: Never Smoker     Smokeless tobacco: Never Used   Substance and Sexual Activity     Alcohol use: Yes     Drug use: No     Sexual activity: Not on file   Lifestyle     Physical activity:     Days per week: Not on file     Minutes per session: Not on file     Stress: Not on file   Relationships     Social connections:     Talks on phone: Not on file     Gets together: Not on file     Attends Buddhism service: Not on file     Active member of club or organization: Not on file     Attends meetings of clubs or organizations: Not on file     Relationship status: Not on file     Intimate partner violence:     Fear of current or ex partner: Not on file     Emotionally abused: Not on file     Physically abused: Not on file     Forced sexual activity: Not on file   Other Topics Concern     Not on file   Social History Narrative     Not on file       Review of Systems  12 point review of systems was completed and found to be negative except for what is been stated above.      Objective:         Vitals:    02/18/20 0817   BP: 110/62   Pulse: 72   Resp: 16   Temp: 98  F (36.7  C)   TempSrc: Oral   Weight: 192 lb 6 oz (87.3 kg)   Height: 5' 7\" (1.702 m)       Physical Exam:  General Appearance: Alert, cooperative, no distress, appears stated age   Head: Normocephalic, without obvious abnormality, atraumatic  Eyes: PERRL, conjunctiva/corneas clear, EOM's intact   Ears: Normal TM's and external ear canals, both ears  Nose:Nares normal, septum midline,mucosa normal, no drainage    Throat:Lips, mucosa, and tongue normal; teeth and gums normal  Neck: Supple, symmetrical, trachea midline, no adenopathy;  " thyroid: not enlarged, symmetric, no tenderness/mass/nodules  Back: Symmetric, no curvature, ROM normal,  Lungs: Clear to auscultation bilaterally, respirations unlabored  Breasts: No breast masses, tenderness, asymmetry, or nipple discharge.  Heart: Regular rate and rhythm, S1 and S2 normal, no murmur, rub, or gallop  Abdomen: Soft, non-tender, bowel sounds active all four quadrants,  no masses, no organomegaly  Pelvic:normal external female genitalia, normal appearing vaginal mucosa and cervix  Extremities: Extremities normal, atraumatic, no cyanosis or edema  Skin: Skin color, texture, turgor normal, no rashes or lesions  Lymph nodes: Cervical, supraclavicular, and axillary nodes normal and   Neurologic: Normal

## 2021-06-09 NOTE — TELEPHONE ENCOUNTER
"Triage Call  Call from patient with concerns of fever, swollen lymph nodes  Temperature 99 orally this morning  Reports fevers at around 99 orally in am and by pm ranging 99.9-100.9  Reports occasional headache   Tested for COVID-19 last Wednesday and negative  Reports first temperature started last Saturday evening, but states lymph nodes have been \"tender/swollen\" for two weeks  Denies sore throat  Denies cough, shortness of breath, sinus congestion, ear pain  Patient concerned that symptoms have started over 10 days ago and concerned if she may need to be retested for COVID   Denies underlying medical conditions    Disposition  Reviewed COVID-19 PCR test result letter with patient. Patient concerned because of symptoms lasting greater that 10 days. See today in office without Triage per protocol. Will schedule for virtual visit per workflow, patient defers [facility]. Educated per care advice, verbalized understanding. Scheduled today with Dr Godoy at 1400.    Reason for Disposition    Fever present > 3 days (72 hours)    Fever present > 3 days (72 hours)    Additional Information    Negative: Difficult to awaken or acting confused (e.g., disoriented, slurred speech)    Negative: Pale cold skin and very weak (can't stand)    Negative: Difficulty breathing and bluish (or gray) lips or face    Negative: New onset rash with purple (or blood-colored) spots or dots    Negative: Sounds like a life-threatening emergency to the triager    Negative: Fever onset within 24 hours of receiving vaccine    Negative: Fever within 21 days of Ebola EXPOSURE    Negative: Postpartum (from 0 to 6 weeks after delivery)    Negative: Headache and stiff neck (can't touch chin to chest)    Negative: Difficulty breathing    Negative: IV drug abuse    Negative: Fever > 103 F (39.4 C)    Negative: Fever > 101 F (38.3 C) and over 60 years of age    Negative: Fever > 100.0 F (37.8 C) and diabetes mellitus or a weak immune system (e.g., HIV " positive, chemotherapy, splenectomy)    Negative: Fever > 100.0 F (37.8 C) and bedridden (e.g., nursing home patient, stroke, chronic illness, recovering from surgery)    Negative: Fever > 100.0 F (37.8 C) and indwelling urinary catheter (e.g., Leone, coude)    Negative: Fever > 100.5 F (38.1 C) and has port (portacath), central line, or PICC line    Negative: Drinking very little and has signs of dehydration (e.g., no urine > 12 hours, very dry mouth, very lightheaded)    Negative: Patient sounds very sick or weak to the triager    Negative: Fever > 100.5 F (38.1 C) and surgery in the past month    Negative: Transplant patient (e.g., liver, heart, lung, kidney)    Negative: Widespread rash and cause unknown    Negative: Patient wants to be seen    Negative: SEVERE difficulty breathing (e.g., struggling for each breath, speaks in single words)    Negative: Difficult to awaken or acting confused (e.g., disoriented, slurred speech)    Negative: Bluish (or gray) lips or face now    Negative: Shock suspected (e.g., cold/pale/clammy skin, too weak to stand, low BP, rapid pulse)    Negative: Sounds like a life-threatening emergency to the triager    Negative: [1] COVID-19 exposure AND [2] no symptoms    Negative: COVID-19 and Breastfeeding, questions about    Negative: [1] Adult with possible COVID-19 symptoms AND [2] triager concerned about severity of symptoms or other causes    Negative: SEVERE or constant chest pain or pressure (Exception: mild central chest pain, present only when coughing)    Negative: MODERATE difficulty breathing (e.g., speaks in phrases, SOB even at rest, pulse 100-120)    Negative: Patient sounds very sick or weak to the triager    Negative: MILD difficulty breathing (e.g., minimal/no SOB at rest, SOB with walking, pulse <100)    Negative: Chest pain or pressure    Negative: Fever > 103 F (39.4 C)    Negative: [1] Fever > 101 F (38.3 C) AND [2] age > 60    Negative: [1] Fever > 100.0 F (37.8 C)  AND [2] bedridden (e.g., nursing home patient, CVA, chronic illness, recovering from surgery)    Negative: HIGH RISK patient (e.g., age > 64 years, diabetes, heart or lung disease, weak immune system)    Protocols used: FEVER-A-OH, CORONAVIRUS (COVID-19) DIAGNOSED OR AWUDIXMSN-Y-LY 5.16.20    COVID 19 Nurse Triage Plan/Patient Instructions    Please be aware that novel coronavirus (COVID-19) may be circulating in the community. If you develop symptoms such as fever, cough, or SOB or if you have concerns about the presence of another infection including coronavirus (COVID-19), please contact your health care provider or visit [email or web address].     Disposition/Instructions    Virtual Visit with provider recommended. Reference Visit Selection Guide.    Thank you for taking steps to prevent the spread of this virus.  o Limit your contact with others.  o Wear a simple mask to cover your cough.  o Wash your hands well and often.    [facility]: About COVID-19: [email or web address]    CDC: What to Do If You're Sick: www.cdc.gov/coronavirus/2019-ncov/about/steps-when-sick.html    CDC: Ending Home Isolation: www.cdc.gov/coronavirus/2019-ncov/hcp/disposition-in-home-patients.html     CDC: Caring for Someone: www.cdc.gov/coronavirus/2019-ncov/if-you-are-sick/care-for-someone.html     [facility]: Interim Guidance for Hospital Discharge to Home: www.health.[facility].[place].us/diseases/coronavirus/hcp/hospdischarge.pdf    [facility] clinical trials (COVID-19 research studies): [email or web address].[facility]-clinical-trials     Below are the COVID-19 hotlines at the [facility] of Health ([facility]). Interpreters are available.   o For health questions: Call 052-664-3894 or 1-347.293.8000 (7 a.m. to 7 p.m.)  o For questions about schools and childcare: Call 363-790-2303 or 1-424.990.5267 (7 a.m. to 7 pmaria isabel Banuelos RN [facility] 7/13/2020 10:23 AM

## 2021-06-09 NOTE — PROGRESS NOTES
"Myrna Bernstein is a 50 y.o. female who is being evaluated via a billable video visit.      The patient has been notified of following:     \"This video visit will be conducted via a call between you and your physician/provider. We have found that certain health care needs can be provided without the need for an in-person physical exam.  This service lets us provide the care you need with a video conversation.  If a prescription is necessary we can send it directly to your pharmacy.  If lab work is needed we can place an order for that and you can then stop by our lab to have the test done at a later time.    Video visits are billed at different rates depending on your insurance coverage. Please reach out to your insurance provider with any questions.    If during the course of the call the physician/provider feels a video visit is not appropriate, you will not be charged for this service.\"    Patient has given verbal consent to a Video visit? Yes  How would you like to obtain your AVS? AVS Preference: Jymobt.  Patient would like the video invitation sent by: Jyoti  Will anyone else be joining your video visit? No        Video Start Time: 2:02 PM    -------------------------    Assessment/Plan:    Myrna Bernstein is a 50 y.o. female presenting for:    1. Fever, unspecified fever cause  Patient really does not have any localizing symptoms other than some mild lymphadenopathy.  Given her fevers I think it is reasonable for her to continue self-isolation.  She is interested in a COVID-19 test again given that she continues to have fevers.  We discussed that she did already have the test and it was negative however she wishes to be tested 1 more time for her work purposes as this was requested by her supervisor.    Test was placed.  If she is feeling better by Thursday she does not need to get this and can go back to work next Monday.  If she is not feeling better by Thursday should schedule the test for Thursday " morning in hopes that this will be back by the time for her to go back to work on Monday as long as she is not having fevers.  - Symptomatic COVID-19 Virus (CORONAVIRUS) PCR; Future    2. Lymphadenopathy  - Symptomatic COVID-19 Virus (CORONAVIRUS) PCR; Future        Medications Discontinued During This Encounter   Medication Reason     CHOLINE ORAL            Chief Complaint:  Chief Complaint   Patient presents with     Follow-up     RN Triage- Neg COVID last week but has swollen lymphnodes       Subjective:   Myrna Bernstein is a pleasant 50 y.o. female being evaluated via video visit today for the following concern/s:     Fever: Patient notes that about a week and a half ago she had a low-grade fever of 99.7 intermittently for a few days.  She was feeling better on Monday went to work.  On Tuesday morning she had a fever of 99.7 and stayed home.  She ended up getting a COVID test on Wednesday which was negative.  She continues to have intermittent fevers.  This is more prominent in the evening and less prominent in the morning.  She occasionally takes Tylenol.  She has mild headaches.  Otherwise, no coughing or shortness of breath.  She has been experiencing some lymph node swelling.  Initially this was on the left however now this is on her right anterior submandibular area.    No one else has been sick.  She has not been historically wearing a mask when she is out at the grocery store or the gas station.      12 point review of systems completed and negative except for what has been described above    Social History     Tobacco Use   Smoking Status Never Smoker   Smokeless Tobacco Never Used       Current Outpatient Medications   Medication Sig     venlafaxine (EFFEXOR-XR) 37.5 MG 24 hr capsule Take 1 capsule (37.5 mg total) by mouth daily.         Objective:  No flowsheet data found.        There is no height or weight on file to calculate BMI.    General: No acute distress  Psych: Appropriate affect  HEENT:  moist mucous membranes  Pulmonary: Breathing comfortably, speaking in complete sentences  Extremities: warm and well perfused with no edema  Skin: warm and dry with no rash         This note has been dictated and transcribed using voice recognition software.   Any errors in transcription are unintentional and inherent to the software.      Video-Visit Details    Type of service:  Video Visit    Video End Time (time video stopped): 2:20 PM  Originating Location (pt. Location): Home    Distant Location (provider location):  [facility]/OB     Platform used for Video Visit: Tim Schumacher MD

## 2021-06-10 NOTE — PROGRESS NOTES
Patient having left foot pain for about four months.  Currently 2/10, has been 8/10 some days.  Pain is exacerbated with certain positions of the foot and hurts worse right after walking on it.  Patient has tried icing and occasional ibuprofen.

## 2021-06-10 NOTE — PROGRESS NOTES
"ASSESSMENT/PLAN  1. Left foot pain  -Naproxen (Aleve) Take 1-2 pills every 12 hours for 1 week  - XR Foot Left 3 or More VWS  - Ambulatory referral to Podiatry    X-ray self review shows calcaneus bone spurs, osteophyte in lateral mid foot and another between the 1-2 MTP joints, no fractures found.  Recommend patient to follow with podiatry to assess if osteophytes are the cause of her pain    SUBJECTIVE:  Myrna Bernstein is a 50 y.o. female who presents for a  left foot pain for the past 2 months. Pain is located on the lateral foot. Denies any injury or known trauma. Does have a history of plantar fascitis on the same foot. Has had a couple of ankle rolls this year on the left foot.     Social History     Tobacco Use   Smoking Status Never Smoker   Smokeless Tobacco Never Used     Patient Active Problem List   Diagnosis     ___ Previous Episodes Of Acute Recurrent Otitis Media     Premenstrual Syndromes     Itching Of The Left Ear     Lateral Epicondylitis     Anxiety     Colon cancer screening     Current Outpatient Medications   Medication Sig     glucosamine-chondroitin 500-400 mg tablet Take 1 tablet by mouth daily.     venlafaxine (EFFEXOR-XR) 37.5 MG 24 hr capsule Take 1 capsule (37.5 mg total) by mouth daily.     Allergies   Allergen Reactions     Erythromycin Base        OBJECTIVE:  /77 (Patient Site: Left Arm, Patient Position: Sitting, Cuff Size: Adult Regular)   Pulse 86   Ht 5' 7\" (1.702 m)   Wt 189 lb 9.6 oz (86 kg)   SpO2 98%   BMI 29.70 kg/m    Appearance: alert, well appearing, and in no distress.  Foot/ankle exam: normal exam, no swelling, tenderness, instability; ligaments intact, FROM all ankle/foot joints, Pain with palpation of left 5th Metatarsal and MTP joint tenderness remainder of foot and ankle exam is normal.      "

## 2021-06-10 NOTE — PROGRESS NOTES
FOOT AND ANKLE SURGERY/PODIATRY CONSULT NOTE         ASSESSMENT:   Peroneal Tendinitis       TREATMENT:  -Symptoms appear greatest along abductor digiti minimi muscle and distal peroneal brevis. Because her symptoms are very mild at this time, I recommend she try a course of naproxen with reduced walking.     -I have asked her to follow-up with me in 3-4 weeks if symptoms persist.     Rubén Burgos DPM  [facility] Podiatry/Foot & Ankle Surgery      HPI: I was asked to see Myrna Bernstein today by Veronica Rodrigess for left foot pain. The patient states she first noticed pain about 2 months ago, denies trauma or change in activity level. She was given a prescription for naprosyn but did not take it past 2 days. Currently having minimal symptoms today.       Past Medical History:   Diagnosis Date     Anxiety      Arthritis      Depression        Past Surgical History:   Procedure Laterality Date     COLONOSCOPY N/A 3/7/2019    Procedure: COLONOSCOPY;  Surgeon: Jenny Edward DO;  Location: [facility];  Service: General       Allergies   Allergen Reactions     Erythromycin Base          Current Outpatient Medications:      glucosamine-chondroitin 500-400 mg tablet, Take 1 tablet by mouth daily., Disp: , Rfl:      venlafaxine (EFFEXOR-XR) 37.5 MG 24 hr capsule, Take 1 capsule (37.5 mg total) by mouth daily., Disp: 90 capsule, Rfl: 3     naproxen (NAPROSYN) 500 MG tablet, Take 1 tablet (500 mg total) by mouth 2 (two) times a day with meals for 14 days., Disp: 28 tablet, Rfl: 0    Family History   Problem Relation Age of Onset     Diabetes Mother      Myasthenia gravis Mother      Hypertension Mother      Hypothyroidism Father      Hypertension Father      Dementia Father      Prostate cancer Father      Diabetes Brother      Breast cancer Maternal Grandmother        Social History     Socioeconomic History     Marital status:      Spouse name: Not on file     Number of children: Not on file      Years of education: Not on file     Highest education level: Not on file   Occupational History     Not on file   Social Needs     Financial resource strain: Not on file     Food insecurity     Worry: Not on file     Inability: Not on file     Transportation needs     Medical: Not on file     Non-medical: Not on file   Tobacco Use     Smoking status: Never Smoker     Smokeless tobacco: Never Used   Substance and Sexual Activity     Alcohol use: Yes     Drug use: No     Sexual activity: Not on file   Lifestyle     Physical activity     Days per week: Not on file     Minutes per session: Not on file     Stress: Not on file   Relationships     Social connections     Talks on phone: Not on file     Gets together: Not on file     Attends Jain service: Not on file     Active member of club or organization: Not on file     Attends meetings of clubs or organizations: Not on file     Relationship status: Not on file     Intimate partner violence     Fear of current or ex partner: Not on file     Emotionally abused: Not on file     Physically abused: Not on file     Forced sexual activity: Not on file   Other Topics Concern     Not on file   Social History Narrative     Not on file       Review of Systems - 10 point Review of Systems is negative except for left foot pain which is noted in HPI.    OBJECTIVE:  Appearance: alert, well appearing, and in no distress.    There were no vitals filed for this visit.    BMI= There is no height or weight on file to calculate BMI.    General appearance: Patient is alert and fully cooperative with history & exam.  No sign of distress is noted during the visit.     Psychiatric: Affect is pleasant & appropriate.  Patient appears motivated to improve health.     Respiratory: Breathing is regular & unlabored while sitting.     HEENT: Hearing is intact to spoken word.  Speech is clear.  No gross evidence of visual impairment that would impact ambulation.      Vascular: Dorsalis pedis and  posterior tibial pulses are palpable. There is pedal hair growth bilateral.  CFT < 3 sec from anterior tibial surface to distal digits bilateral. There is no appreciable edema noted.  Dermatologic: Turgor and texture are within normal limits. No coloration or temperature changes. No primary or secondary lesions noted.  Neurologic: All epicritic and proprioceptive sensations are grossly intact bilateral.  Musculoskeletal: Mild pain proximal and lateral to 5th MPJ along the abductor digiti minimi muscle, no pain along 5th metatarsal shaft. Mild pain distal peroneal brevis left.     Imaging:     Xr Foot Left 3 Or More Vws    Result Date: 8/5/2020  EXAM: XR FOOT LEFT 3 OR MORE VWS LOCATION: [facility] DATE/TIME: 8/5/2020 4:08 PM INDICATION: lateral foot pain, proximal to the 5th MTP joint COMPARISON: None.     Normal joint spaces and alignment. No fracture. Lateral bony structures appear unremarkable.

## 2021-06-15 NOTE — PROGRESS NOTES
FOOT AND ANKLE SURGERY/PODIATRY Progress Note        ASSESSMENT:   Plantar Fasciitis  Gastrosoleus Equinus       TREATMENT:  -Patient has pain along the plantar heel at insertion of plantar fascia consistent with plantar fasciitis. We discussed treatment options to include stretching exercises, anti-inflammatory medication, orthotics, steroid injections, physical therapy and a night splint.     -All questions invited and answered. I will start her on Naproxen and have referred her to [facility] for custom orthotics.     -I have asked her to follow-up after using the orthotics x3-4 weeks if symptoms continue.     Rubén Burgos DPM  [facility] Podiatry/Foot & Ankle Surgery      HPI: Patient returns to see me today for left arch and heel pain. The patient states the previous prescription for naproxen relieved the peroneal tendinitis. She has developed pain along the medial heel with walking. Her current employment requires her to walk during the day.     Past Medical History:   Diagnosis Date     Anxiety      Arthritis      Depression        Past Surgical History:   Procedure Laterality Date     COLONOSCOPY N/A 3/7/2019    Procedure: COLONOSCOPY;  Surgeon: Jenny Edward DO;  Location: [facility];  Service: General       Allergies   Allergen Reactions     Erythromycin Base          Current Outpatient Medications:      glucosamine-chondroitin 500-400 mg tablet, Take 1 tablet by mouth daily., Disp: , Rfl:      naproxen (NAPROSYN) 500 MG tablet, Take 1 tablet (500 mg total) by mouth 2 (two) times a day with meals for 14 days., Disp: 28 tablet, Rfl: 0     venlafaxine (EFFEXOR-XR) 37.5 MG 24 hr capsule, Take 1 capsule (37.5 mg total) by mouth daily., Disp: 90 capsule, Rfl: 3    Family History   Problem Relation Age of Onset     Diabetes Mother      Myasthenia gravis Mother      Hypertension Mother      Hypothyroidism Father      Hypertension Father      Dementia Father      Prostate cancer Father       Diabetes Brother      Breast cancer Maternal Grandmother        Social History     Socioeconomic History     Marital status:      Spouse name: Not on file     Number of children: Not on file     Years of education: Not on file     Highest education level: Not on file   Occupational History     Not on file   Social Needs     Financial resource strain: Not on file     Food insecurity     Worry: Not on file     Inability: Not on file     Transportation needs     Medical: Not on file     Non-medical: Not on file   Tobacco Use     Smoking status: Never Smoker     Smokeless tobacco: Never Used   Substance and Sexual Activity     Alcohol use: Yes     Drug use: No     Sexual activity: Not on file   Lifestyle     Physical activity     Days per week: Not on file     Minutes per session: Not on file     Stress: Not on file   Relationships     Social connections     Talks on phone: Not on file     Gets together: Not on file     Attends Jew service: Not on file     Active member of club or organization: Not on file     Attends meetings of clubs or organizations: Not on file     Relationship status: Not on file     Intimate partner violence     Fear of current or ex partner: Not on file     Emotionally abused: Not on file     Physically abused: Not on file     Forced sexual activity: Not on file   Other Topics Concern     Not on file   Social History Narrative     Not on file       Review of Systems - 10 point Review of Systems is negative except for left heel pain which is noted in HPI.    OBJECTIVE:  Appearance: alert, well appearing, and in no distress.    General appearance: Patient is alert and fully cooperative with history & exam.  No sign of distress is noted during the visit.     Psychiatric: Affect is pleasant & appropriate.  Patient appears motivated to improve health.     Respiratory: Breathing is regular & unlabored while sitting.     HEENT: Hearing is intact to spoken word.  Speech is clear.  No gross  evidence of visual impairment that would impact ambulation.    Vascular: Dorsalis pedis and posterior tibial pulses are palpable. There is pedal hair growth left.  CFT < 3 sec from anterior tibial surface to distal digits left. There is no appreciable edema noted.  Dermatologic: Turgor and texture are within normal limits. No coloration or temperature changes. No primary or secondary lesions noted.  Neurologic: All epicritic and proprioceptive sensations are grossly intact left.  Musculoskeletal: Mild pain along the plantar left heel at the insertion of the plantar fascia. Limited ankle dorsiflexion with knee extended and flexed.     Imaging:     No results found.

## 2021-06-15 NOTE — PROGRESS NOTES
Assessment/Plan:     Health maintenance female exam.  All questions answered.  Await pap smear results.  Breast self exam technique reviewed and patient encouraged to perform self-exam monthly.  Discussed healthy lifestyle modifications.  Mammogram ordered.  Await fasting lab results  The following high BMI interventions were performed this visit: encouragement to exercise and weight monitoring    1. Visit for screening mammogram  - Mammo Screening Bilateral; Future    2. Routine general medical examination at a health care facility  - Gynecologic Cytology (PAP Smear)  - Lipid Cascade FASTING  - Glucose  - Hemoglobin  - Influenza, Seasonal,Quad Inj, 36+ MOS  - Tdap vaccine,  6yo or older,  IM    3. Depression, unspecified depression type  I will start the patient back on Effexor.  Certainly she could use this for 6 months and then get off of it if she would like.  We discussed the risks and benefits of the medication but she is tolerated this well in the past.  She will let me know if she has any issues or if she would like to increase the dose.  - venlafaxine (EFFEXOR XR) 37.5 MG 24 hr capsule; Take 1 capsule (37.5 mg total) by mouth daily.  Dispense: 90 capsule; Refill: 2    4. Chronic left shoulder pain  I suspect that this is a rotator cuff tendinitis which was caused by her fall many years ago.  It seems like this waxes and wanes a bit.  I did give her some physical therapy exercises.  She will let me know if she would like to proceed with seeing the physical therapist orienting the orthopedist regarding this.  We could consider doing a joint injection as well and I discussed that with her today.    The patient does note that her menstrual cycles have become a bit more painful and heavier.  They do space out a bit and she does feel as though she is in perimenopause.  We did discuss taking oral contraceptive pills for the short-term to see if this would help her symptoms however she is not interested at this  "point.  She will let me know if she changes her mind.      Subjective:      Myrna Bernstein is a 47 y.o. female who presents for an annual exam.  She is overall doing well.  She is 3 children who are now all in college.  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    Her parents unfortunately not been doing very well recently.  They live in an assisted living in the area.  Her and her sister are doing the bulk of the caretaking as far as bringing them to appointments.  She does not find this to be overly stressful.    The patient notes that she has a past medical history significant for depression and anxiety.  She feels with her children gone and her parents health not doing well she has been feeling a bit more depressed recently.  She was on Effexor in the past and did well with the medication.  She thinks that she may want to get back on that medication.  She does not think that she wants to see a therapist at this point.    Left shoulder pain: Patient notes that she  her shoulder about 12 years ago after a fall.  She states that it has \"never been the same\" since that time.  She was trying to do some classes at the gym and noticed that her shoulder would become painful throughout the class and then later in the day.  She would be willing to do some physical therapy exercises for this.  She does not notice any swelling.  She does notice some decreased range of motion when it is flared.    Healthy Habits:   Regular Exercise: Yes  Sunscreen Use: Yes  Healthy Diet: Yes  Dental Visits Regularly: Yes  Seat Belt: Yes  Sexually active: Yes  Self Breast Exam Monthly:Yes  Colonoscopy: Yes  Prevention of Osteoporosis: Yes  Last Dexa: N/A    Immunization History   Administered Date(s) Administered     Hep A, historic 10/08/2007, 04/04/2008     Influenza, inj, historic,unspecified 10/08/2007, 09/14/2009, " 10/04/2012     Influenza, seasonal,quad inj 36+ mos 10/15/2015, 2018     Influenza, seasonal,quad inj 6-35 mos 2008, 10/22/2010, 2011, 10/16/2013, 10/13/2014     Td,adult,historic,unspecified 1997     Tdap 10/08/2007, 2018     Immunization status: due today.    Gynecologic History  Patient's last menstrual period was 2017 (exact date).  Contraception: none  Last Pap: . Results were: normal  Last mammogram: . Results were: normal      OB History    Para Term  AB Living   3 3 3      SAB TAB Ectopic Multiple Live Births             # Outcome Date GA Lbr Ang/2nd Weight Sex Delivery Anes PTL Lv   3 Term            2 Term            1 Term                   Current Outpatient Prescriptions   Medication Sig Dispense Refill     venlafaxine (EFFEXOR XR) 37.5 MG 24 hr capsule Take 1 capsule (37.5 mg total) by mouth daily. 90 capsule 2     No current facility-administered medications for this visit.      No past medical history on file.  No past surgical history on file.  Erythromycin base  No family history on file.  Social History     Social History     Marital status:      Spouse name: N/A     Number of children: N/A     Years of education: N/A     Occupational History     Not on file.     Social History Main Topics     Smoking status: Never Smoker     Smokeless tobacco: Not on file     Alcohol use Not on file     Drug use: Not on file     Sexual activity: Not on file     Other Topics Concern     Not on file     Social History Narrative       Review of Systems  General:  Denies problems  Eyes:  Denies problems  Ears/Nose/Throat:  Denies problems  Cardiovascular:  Denies problems  Respiratory:  Denies problems  Gastrointestinal:  Denies problems  Genitourinary:  Denies problems  Musculoskeletal:  Denies problems  Skin:  Denies problems, Neurologic:  Denies problems  Psychiatric:  Denies problems  Endocrine:  Denies problems  Heme/Lymphatic:  Denies  "problems  Allergic/Immunologic:  Denies problems       Objective:         Vitals:    01/03/18 0800   BP: 110/70   Pulse: 76   Resp: 16   Temp: 98  F (36.7  C)   TempSrc: Oral   Weight: 192 lb (87.1 kg)   Height: 5' 6.75\" (1.695 m)       Physical Exam:  General Appearance: Alert, cooperative, no distress, appears stated age   Head: Normocephalic, without obvious abnormality, atraumatic  Eyes: PERRL, conjunctiva/corneas clear, EOM's intact   Ears: Normal TM's and external ear canals, both ears  Nose:Nares normal, septum midline,mucosa normal, no drainage    Throat:Lips, mucosa, and tongue normal; teeth and gums normal  Neck: Supple, symmetrical, trachea midline, no adenopathy;  thyroid: not enlarged, symmetric, no tenderness/mass/nodules  Back: Symmetric, no curvature, ROM normal,  Lungs: Clear to auscultation bilaterally, respirations unlabored  Breasts: No breast masses, tenderness, asymmetry, or nipple discharge.  Heart: Regular rate and rhythm, S1 and S2 normal, no murmur, rub, or gallop  Abdomen: Soft, non-tender, bowel sounds active all four quadrants,  no masses, no organomegaly  Pelvic:normal external female genitalia, normal appearing vaginal mucosa and cervix  Extremities: Extremities normal, atraumatic, no cyanosis or edema  Skin: Skin color, texture, turgor normal, no rashes or lesions  Lymph nodes: Cervical, supraclavicular, and axillary nodes normal and   Neurologic: Normal       "

## 2021-06-16 PROBLEM — M24.573 EQUINUS CONTRACTURE OF ANKLE: Status: ACTIVE | Noted: 2021-02-05

## 2021-06-16 PROBLEM — M72.2 PLANTAR FASCIITIS: Status: ACTIVE | Noted: 2021-02-05

## 2021-06-17 DIAGNOSIS — Z00.00 HEALTHCARE MAINTENANCE: ICD-10-CM

## 2021-06-17 LAB
T4 FREE SERPL-MCNC: 0.88 NG/DL (ref 0.76–1.46)
TSH SERPL DL<=0.005 MIU/L-ACNC: 5.95 MU/L (ref 0.4–4)

## 2021-06-17 PROCEDURE — 84439 ASSAY OF FREE THYROXINE: CPT | Performed by: FAMILY MEDICINE

## 2021-06-17 PROCEDURE — 84443 ASSAY THYROID STIM HORMONE: CPT | Performed by: FAMILY MEDICINE

## 2021-06-17 PROCEDURE — 36415 COLL VENOUS BLD VENIPUNCTURE: CPT | Performed by: FAMILY MEDICINE

## 2021-06-18 NOTE — PATIENT INSTRUCTIONS - HE
Patient Instructions by Sonia Nascimento RN at 2/5/2021  3:40 PM     Author: Sonia Nascimento RN Service: -- Author Type: Registered Nurse    Filed: 2/5/2021  4:05 PM Encounter Date: 2/5/2021 Status: Signed    : Sonia Nascimento RN (Registered Nurse)       Take naproxen as ordered.    Get custom orthotics:  [facility] (Please call to make an appointment)    [facility]  [address] [place] [address], [address]  [place], [place].  Phone: [phone]    Call clinic to be seen in office if still having pain in 4 weeks.    What is Plantar Fasciitis?  Plantar Fasciitis also referred to as heel pain syndrome is the most common cause cited for pain in heels. Plantar Fasciitis is the pain and inflammation at the point where the flat band of tissue called the plantar fascia connects the heel bone to the toes. Plantar fascia maintains the arch of the foot. Applying pressure on it will make it swollen, weak, and irritated. This will make the heel of your foot to ache when you walk or stand for a prolonged period.    Symptoms  Most people suffering from Plantar Fasciitis experience pain when they walk after resting their feet for a long duration. You may experience less pain and stiffness after a few steps. But your foot may hurt more as the day goes on. It may hurt the most when you climb stairs or after you stand for a long time. Continuous foot pain at night might be due to different problems like arthritis or nerve problems.    Causes  Straining the ligament which supports the arch can cause Plantar Fasciitis. Repeated straining can cause tiny tears in the ligament which lead to pain and swelling. Plantar Fasciitis is very evident in cases of:  Tight Achilles tendon or calf muscles   Running long distances, especially on hard & uneven surfaces   Problems of the foot arch   Sudden obesity   Wearing shoes with soft soles or poor arch support    In-toeing    Treatment Options  Anti-Inflammatory Medication   - Ibuprofen 600 mg by mouth 3 times per day with food     (discontinue if stomach irritation occurs)    - Topical pain creams from [facility].     * apply 1-2 grams to painful areas 3 times per day prior to      Stretching    - Oral Prednisone  Orthotics (functional orthotics) Insurance Code:  (custome made, doctor prescribed)   Steroid injections    - Maximum of 3 injections in one year.   Night Splint   Physical therapy    - Stretching, Ultrasound, etc...

## 2021-06-20 NOTE — LETTER
Letter by Severson, Tammie F, LPN at      Author: Severson, Tammie F, LPN Service: -- Author Type: --    Filed:  Encounter Date: 7/13/2020 Status: (Other)       7/13/2020        Myrna Bernstein  [id number]  [place] [id number]    This letter provides a written record that you were tested for COVID-19 on 7/8/20.     Your result was negative. This means that we didnt find the virus that causes COVID-19 in your sample. A test may show negative when you do actually have the virus. This can happen when the virus is in the early stages of infection, before you feel illness symptoms.    If you have symptoms   Stay home and away from others (self-isolate) until you meet ALL of the guidelines below:    Youve had no fever--and no medicine that reduces fever--for 3 full days (72 hours). And ?    Your other symptoms have gotten better. For example, your cough or breathing has improved. And?    At least 10 days have passed since your symptoms started.    During this time:    Stay home. Dont go to work, school or anywhere else.     Stay in your own room, including for meals. Use your own bathroom if you can.    Stay away from others in your home. No hugging, kissing or shaking hands. No visitors.    Clean high touch surfaces often (doorknobs, counters, handles, etc.). Use a household cleaning spray or wipes. You can find a full list on the EPA website at www.epa.gov/pesticide-registration/list-n-disinfectants-use-against-sars-cov-2.    Cover your mouth and nose with a mask, tissue or washcloth to avoid spreading germs.    Wash your hands and face often with soap and water.    Going back to work  Check with your employer for any guidelines to follow for going back to work.    Employers: This document serves as formal notice that your employee tested negative for COVID-19, as of the testing date shown above.

## 2021-06-20 NOTE — LETTER
Letter by Helen Torres RN at      Author: Helen Torres RN Service: -- Author Type: --    Filed:  Encounter Date: 7/23/2020 Status: (Other)       7/23/2020        Myrna Bernstein  [id number]  [place] [address]    This letter provides a written record that you were tested for COVID-19 on 7/18/2020.     Your result was negative. This means that we didnt find the virus that causes COVID-19 in your sample. A test may show negative when you do actually have the virus. This can happen when the virus is in the early stages of infection, before you feel illness symptoms.    If you have symptoms   Stay home and away from others (self-isolate) until you meet ALL of the guidelines below:    Youve had no fever--and no medicine that reduces fever--for 3 full days (72 hours). And ?    Your other symptoms have gotten better. For example, your cough or breathing has improved. And?    At least 10 days have passed since your symptoms started.    During this time:    Stay home. Dont go to work, school or anywhere else.     Stay in your own room, including for meals. Use your own bathroom if you can.    Stay away from others in your home. No hugging, kissing or shaking hands. No visitors.    Clean high touch surfaces often (doorknobs, counters, handles, etc.). Use a household cleaning spray or wipes. You can find a full list on the EPA website at www.epa.gov/pesticide-registration/list-n-disinfectants-use-against-sars-cov-2.    Cover your mouth and nose with a mask, tissue or washcloth to avoid spreading germs.    Wash your hands and face often with soap and water.    Going back to work  Check with your employer for any guidelines to follow for going back to work.    Employers: This document serves as formal notice that your employee tested negative for COVID-19, as of the testing date shown above.

## 2021-06-21 ENCOUNTER — MYC MEDICAL ADVICE (OUTPATIENT)
Dept: FAMILY MEDICINE | Facility: CLINIC | Age: 51
End: 2021-06-21

## 2021-06-21 DIAGNOSIS — E03.9 HYPOTHYROIDISM, UNSPECIFIED TYPE: Primary | ICD-10-CM

## 2021-06-22 RX ORDER — LEVOTHYROXINE SODIUM 25 UG/1
25 TABLET ORAL DAILY
Qty: 90 TABLET | Refills: 1 | Status: SHIPPED | OUTPATIENT
Start: 2021-06-22 | End: 2022-01-25

## 2021-06-23 NOTE — PATIENT INSTRUCTIONS - HE
Weight Loss:    We measure weight by something called your BMI which is your height vs your weight.  The guidelines for BMI are as follows:    <18.5 - underweight  18.5 - 25 - normal BMI  25 - 30 - overweight  > 30 - obese    Your BMI today was: Body mass index is 29.55 kg/m .    Of course this does not take into account muscle mass (most body builders would be considered obese by this standard) but it gives you a good idea of where you are with regard to weight goals.    Weight loss can be difficult (and becomes more so as we age and as our metabolism slows).  Oftentimes we can eat the same diet as we did previously and note that we continue to gain weight.     I have included a list of some helpful tips for weight loss below -     Physical Activity:    It is important to do something that challenges you physically (and gets your heart rate up/ makes you sweat 4-5 times a week for 30-60 minutes)    Walking (or having an active job) is great but it is oftentimes not enough to even maintain weight let alone aid with weight loss.  If walking the dog daily is your physical activity and it is working for you - great! Consider yourself viry - if you continue to gain weight you need to ramp up the physical activity.    You can start with adding some interval jogging to your walks (i.e. walking for a minute and then jogging for a minute and continue to build up) or whatever other physical activity you may choose that gets your heart rate up and challenges you physically.    Don't underestimate the power of strength training as well!      If it is difficult to get outside/ get to the gym try an exercise video - not having a gym membership or bad weather should not prevent you from staying healthy!    Diet:    Along with maintaining an active lifestyle, it is important to eat a diet that helps promote weight loss.    There are several components to a healthy diet that I feel are important:    1. Cutting out/ cutting down on  processed foods - it is easy to grab a prepackaged lunch or make something from a box for dinner but oftentimes these foods are loaded with preservatives and (even if they are low in calories) do not have much nutritional value.  You are much better off eating fresh foods - the less processed the better.    2. Eating snacks/ meals with fiber and protein - again avoiding the traps that advertisers set (i.e. 100 calorie snack packs of cookies, etc.) - these snacks will not fill you up and may actually make you more hungry.  You are much better off eating a hard boiled egg, a piece of turkey, or even some low fat cheese.    3. Be aware of the calories that you consume - it is unfortunately far to easy to eat to excess.  Most bread has over 100 calories/slice - making a sandwich (before you even put anything on it) over 200 calories!  Most of us are completely unaware of the number of calories we consume.  There are apps. like [employer] that can help keep track for you.  No need to become obsessive about it but tracking a few days a month may help you stay on track.  Of course, it is about the quality of the calories that are consumed as well (i.e. cutting out processed foods) but I think it is helpful to know how many calories we are taking in.    4. Stop drinking soda! - it doesn't matter if it is diet - there are good studies that show that people who drink soda (even diet) consume more calories and are more apt to be overweight/obese.  I know that this can be difficult but trying fizzy water instead can be helpful.  A soda on occasion (1 a week if you must) is unlikely to do much harm but even one soda a day has been linked to overweight/ obesity.    Here are some helpful tips for weight loss:    1. Stop drinking soda (even diet) and cut out any beverages that are caloric (orange juice, iced tea with sweetener, lemonade, etc).  These are OK to drink on special occasions but otherwise are just empty calories.       2. Do not eat mindlessly (i.e. do not eat while on the computer or watching TV) and if you do make sure to pre-portion your food (do not eat chips directly from the bag)    3. Brush your teeth after dinner and don't eat anything else - many people eat evening snacks that are high in calories and low on nutritional content.    Some apps that I have found helpful (all free):    myfitnesspal - can help you track calories and physical activity    fooducate - allows you to scan barcodes of food and will grade them from an A to an F based on nutritional value.  It will tell you why something scored what it did and also give you healthier alternatives.    Please let me know if you have questions or if you have found any particular apps. Or websites to be helpful and I can add them to the list!

## 2021-06-23 NOTE — PROGRESS NOTES
Assessment/Plan:     Health maintenance female exam.  All questions answered.  PAP UTD - due again in 2023  Breast self exam technique reviewed and patient encouraged to perform self-exam monthly.  Discussed healthy lifestyle modifications.  Mammogram ordered.  Await fasting lab results  The following high BMI interventions were performed this visit: weight monitoring    1. Visit for screening mammogram  - Mammo Screening Bilateral; Future    2. Screen for colon cancer  Will contact her in late spring when insurance changes    3. Depression, unspecified depression type  Refill effexor  - venlafaxine (EFFEXOR-XR) 37.5 MG 24 hr capsule; Take 1 capsule (37.5 mg total) by mouth daily.  Dispense: 90 capsule; Refill: 3    4. Healthcare maintenance  - Glycosylated Hemoglobin A1c  - Lipid Cascade  - Hemoglobin  - Thyroid Cascade          Subjective:      Myrna Bernstein is a 48 y.o. female who presents for an annual exam.      She is overall doing well.  For the last 6 months she has been [facility] at the [employer] doing classes for 5 days a week.  She unfortunately states that she has not noticed much weight loss.  She does state that she feels as though there is room to improve with her diet.      She is currently taking Effexor and has been for the last year to help with some anxiety and depression.  She finds as though it is helpful.    Her parents are living in assisted care living.  Her and her siblings help them get to doctor's appointments.    She works as a  speech therapist.  She feels as though she might be looking for a different position sometime in the next year.    Healthy Habits:   Regular Exercise: Yes  Sunscreen Use: Yes  Healthy Diet: Yes  Dental Visits Regularly: Yes  Seat Belt: Yes  Sexually active: Yes  Self Breast Exam Monthly:Yes  Colonoscopy: No  Prevention of Osteoporosis: Yes  Last Dexa: N/A    Immunization History   Administered Date(s) Administered     Hep A, historic 10/08/2007,  2008     Influenza, inj, historic,unspecified 10/08/2007, 2009, 10/04/2012     Influenza, seasonal,quad inj 36+ mos 10/15/2015, 2018     Influenza, seasonal,quad inj 6-35 mos 2008, 10/22/2010, 2011, 10/16/2013, 10/13/2014     Td,adult,historic,unspecified 1997     Tdap 10/08/2007, 2018     Immunization status: up to date and documented, due today - influenza.    Gynecologic History  Patient's last menstrual period was 2019.  Contraception: none  Last Pap: 2018. Results were: normal  Last mammogram: . Results were: normal      OB History    Para Term  AB Living   3 3 3         SAB TAB Ectopic Multiple Live Births                  # Outcome Date GA Lbr Ang/2nd Weight Sex Delivery Anes PTL Lv   3 Term            2 Term            1 Term                   Current Outpatient Medications   Medication Sig Dispense Refill     venlafaxine (EFFEXOR-XR) 37.5 MG 24 hr capsule Take 1 capsule (37.5 mg total) by mouth daily. 90 capsule 3     No current facility-administered medications for this visit.      No past medical history on file.  No past surgical history on file.  Erythromycin base  Family History   Problem Relation Age of Onset     Diabetes Mother      Myasthenia gravis Mother      Hypertension Mother      Hypothyroidism Father      Hypertension Father      Dementia Father      Prostate cancer Father      Diabetes Brother      Breast cancer Maternal Grandmother      Social History     Socioeconomic History     Marital status:      Spouse name: Not on file     Number of children: Not on file     Years of education: Not on file     Highest education level: Not on file   Social Needs     Financial resource strain: Not on file     Food insecurity - worry: Not on file     Food insecurity - inability: Not on file     Transportation needs - medical: Not on file     Transportation needs - non-medical: Not on file   Occupational History     Not on file  "  Tobacco Use     Smoking status: Never Smoker     Smokeless tobacco: Never Used   Substance and Sexual Activity     Alcohol use: Not on file     Drug use: Not on file     Sexual activity: Not on file   Other Topics Concern     Not on file   Social History Narrative     Not on file       Review of Systems  General:  Denies problems  Eyes:  Denies problems  Ears/Nose/Throat:  Denies problems  Cardiovascular:  Denies problems  Respiratory:  Denies problems  Gastrointestinal:  Denies problems  Genitourinary:  Denies problems  Musculoskeletal:  Denies problems  Skin:  Denies problems, Neurologic:  Denies problems  Psychiatric:  Denies problems  Endocrine:  Denies problems  Heme/Lymphatic:  Denies problems  Allergic/Immunologic:  Denies problems       Objective:         Vitals:    01/23/19 0801   BP: 110/62   Pulse: 72   Resp: 18   Temp: 98.5  F (36.9  C)   Weight: 190 lb 1 oz (86.2 kg)   Height: 5' 7.25\" (1.708 m)       Physical Exam:  General Appearance: Alert, cooperative, no distress, appears stated age   Head: Normocephalic, without obvious abnormality, atraumatic  Eyes: PERRL, conjunctiva/corneas clear, EOM's intact   Ears: Normal TM's and external ear canals, both ears  Nose:Nares normal, septum midline,mucosa normal, no drainage    Throat:Lips, mucosa, and tongue normal; teeth and gums normal  Neck: Supple, symmetrical, trachea midline, no adenopathy;  thyroid: not enlarged, symmetric, no tenderness/mass/nodules  Back: Symmetric, no curvature, ROM normal,  Lungs: Clear to auscultation bilaterally, respirations unlabored  Breasts: No breast masses, tenderness, asymmetry, or nipple discharge.  Heart: Regular rate and rhythm, S1 and S2 normal, no murmur, rub, or gallop  Abdomen: Soft, non-tender, bowel sounds active all four quadrants,  no masses, no organomegaly  Extremities: Extremities normal, atraumatic, no cyanosis or edema  Skin: Skin color, texture, turgor normal, no rashes or lesions  Lymph nodes: Cervical, " supraclavicular, and axillary nodes normal and   Neurologic: Normal

## 2021-06-24 NOTE — ANESTHESIA PREPROCEDURE EVALUATION
Anesthesia Evaluation      Patient summary reviewed   No history of anesthetic complications     Airway   Mallampati: II  Neck ROM: full   Pulmonary - negative ROS and normal exam    breath sounds clear to auscultation                         Cardiovascular - negative ROS  Rhythm: regular  Rate: normal,         Neuro/Psych    (+) depression,     Endo/Other    (+) arthritis,      GI/Hepatic/Renal - negative ROS           Dental - normal exam                        Anesthesia Plan  Planned anesthetic: MAC    ASA 2     Anesthetic plan and risks discussed with: patient and spouse    Post-op plan: routine recovery

## 2021-06-24 NOTE — OP NOTE
COLONOSCOPY      Pre-op Dx:              Colon cancer screening      Post-op Dx:            Colon cancer screening      Procedure:             COLONOSCOPY      PCP:                      Carmel Schumacher MD      Findings:                Normal colonscopy      Specimen:             None      Complications:      None      Indication for procedure:    This is a 48-year-old female who was referred for screening colonoscopy.  She has never had a colonoscopy before.  She has not had any changes in her GI symptoms.  She has fairly regular bowel movements and denies any hematochezia.  She denies any family history of colon cancer.    Procedure:    After informed consent was obtained, the patient was brought to the endoscopy suite, placed in a lateral position, and given MAC anesthesia by the anesthesia team.  Digital rectal exam was performed, which failed to reveal any palpable masses.  A well lubricated colonoscope was advanced atraumatically into the anus taken all way up and around to the cecum.  Throughout this process, the patient needed to be repositioned multiple times from the left lateral to supine due to the tortuosity of her colon.  The ileocecal valve was identified.  The scope was slowly drawn back over a period of greater than 6 minutes.  No lesions were seen in the cecum or the ascending colon.  No lesions seen in the transverse colon, descending or sigmoid colon.  There was no evidence of colitis, arteriovenous malformations, or diverticulosis.  The scope was drawn back into the rectum and retroflexed without evidence for any significant hemorrhoidal disease.  The prep was good.  The scope was then completely withdrawn, the patient was awoken, and transferred to the recovery area in stable condition.       Recommendations:   Next Colonoscopy in 10 years      Jenny Edward [facility]  [phone]

## 2021-06-24 NOTE — H&P
"History:   Myrna Bernstein is a 48 y.o. female who presents for a screening colonoscopy.  She has never had a colonoscopy before.  Denies any family history of colon cancer.  Denies any changes in bowel habits over the last few years.  Denies any hematochezia or other GI symptoms.      Past medical history:  Anxiety and depression    Past surgical history:  [place] teeth    Current Outpatient Medications:      venlafaxine (EFFEXOR-XR) 37.5 MG 24 hr capsule, Take 1 capsule (37.5 mg total) by mouth daily., Disp: 90 capsule, Rfl: 3    Allergies:  Erythromycin base    Family history:  They no family history of colon cancer.  Father had prostate cancer.  Grandmother had breast cancer.    Social history:   reports that  has never smoked. she has never used smokeless tobacco. She reports that she drinks alcohol. She reports that she does not use drugs.    Review of Systems:  Pertinent positives in HPI.    EXAM:  Ht 5' 7\" (1.702 m)   Wt 190 lb (86.2 kg)   LMP 02/14/2019   Breastfeeding? No   BMI 29.76 kg/m    Body mass index is 29.76 kg/m .  General : Alert, cooperative, appears stated age   Skin: Skin color, texture, turgor normal, no rashes or lesions   Lymphatic: No obvious adenopathy, no swelling   Eyes: No scleral icterus, pupils equal  HENT: No traumatic injury to the head or face, no gross abnormalities  Lungs: Normal respiratory effort, breath sounds equal bilaterally  Heart: Regular rate and rhythm  Abdomen: Soft and nontender  Musculoskeletal: No obvious swelling  Neurologic: Grossly intact    Assessment/Plan:   Myrna Bernstein is a 48 y.o. female who presents for screening colonscopy.   The risks of the procedure were discussed in detail which include, but are not limited to, bleeding and perforation.  All questions have been answered and informed consent obtained.  To the OR for screening colonoscopy.    Jenny Edward, [facility]  [phone]  "

## 2021-06-24 NOTE — ANESTHESIA POSTPROCEDURE EVALUATION
Patient: Myrna Bernstein  COLONOSCOPY  Anesthesia type: MAC    Patient location: Phase II Recovery  Last vitals:   Vitals:    03/07/19 0850   BP: 116/64   Pulse: 79   Resp: 16   Temp:    SpO2: 100%     Post vital signs: stable  Level of consciousness: awake and responds to simple questions  Post-anesthesia pain: pain controlled  Post-anesthesia nausea and vomiting: no  Pulmonary: unassisted, return to baseline  Cardiovascular: stable and blood pressure at baseline  Hydration: adequate  Anesthetic events: no    QCDR Measures:  ASA# 11 - Julieth-op Cardiac Arrest: ASA11B - Patient did NOT experience unanticipated cardiac arrest  ASA# 12 - Julieth-op Mortality Rate: ASA12B - Patient did NOT die  ASA# 13 - PACU Re-Intubation Rate: NA - No ETT / LMA used for case  ASA# 10 - Composite Anes Safety: ASA10A - No serious adverse event    Additional Notes:

## 2021-07-03 NOTE — ADDENDUM NOTE
Addendum Note by Carmel Schumacher MD at 3/11/2017  8:10 PM     Author: Carmel Schumacher MD Service: -- Author Type: Physician    Filed: 3/11/2017  8:10 PM Encounter Date: 3/9/2017 Status: Signed    : Carmel Schumacher MD (Physician)    Addended by: CARMEL SCHUMACHER on: 3/11/2017 08:10 PM        Modules accepted: Orders

## 2021-07-13 ENCOUNTER — RECORDS - HEALTHEAST (OUTPATIENT)
Dept: ADMINISTRATIVE | Facility: CLINIC | Age: 51
End: 2021-07-13

## 2021-07-15 ENCOUNTER — ANCILLARY PROCEDURE (OUTPATIENT)
Dept: MAMMOGRAPHY | Facility: CLINIC | Age: 51
End: 2021-07-15
Attending: FAMILY MEDICINE
Payer: COMMERCIAL

## 2021-07-15 DIAGNOSIS — Z00.00 HEALTHCARE MAINTENANCE: ICD-10-CM

## 2021-07-15 PROCEDURE — 77063 BREAST TOMOSYNTHESIS BI: CPT

## 2021-07-21 ENCOUNTER — RECORDS - HEALTHEAST (OUTPATIENT)
Dept: ADMINISTRATIVE | Facility: CLINIC | Age: 51
End: 2021-07-21

## 2021-07-26 ENCOUNTER — OFFICE VISIT (OUTPATIENT)
Dept: PODIATRY | Facility: CLINIC | Age: 51
End: 2021-07-26
Payer: COMMERCIAL

## 2021-07-26 VITALS
WEIGHT: 195 LBS | DIASTOLIC BLOOD PRESSURE: 70 MMHG | HEIGHT: 67 IN | SYSTOLIC BLOOD PRESSURE: 110 MMHG | BODY MASS INDEX: 30.61 KG/M2 | OXYGEN SATURATION: 99 % | HEART RATE: 88 BPM

## 2021-07-26 DIAGNOSIS — M24.573 EQUINUS CONTRACTURE OF ANKLE: ICD-10-CM

## 2021-07-26 DIAGNOSIS — M72.2 PLANTAR FASCIITIS: Primary | ICD-10-CM

## 2021-07-26 PROCEDURE — 99213 OFFICE O/P EST LOW 20 MIN: CPT | Performed by: PODIATRIST

## 2021-07-26 RX ORDER — PREDNISONE 10 MG/1
TABLET ORAL
Qty: 30 TABLET | Refills: 0 | Status: SHIPPED | OUTPATIENT
Start: 2021-07-26 | End: 2021-11-16

## 2021-07-26 ASSESSMENT — MIFFLIN-ST. JEOR: SCORE: 1522.14

## 2021-07-26 ASSESSMENT — PAIN SCALES - GENERAL: PAINLEVEL: SEVERE PAIN (6)

## 2021-07-26 NOTE — LETTER
7/26/2021         RE: Myrna Bernstein  [address]  [place] [address]        Dear Colleague,    Thank you for referring your patient, Myrna Bernstein, to the [facility]. Please see a copy of my visit note below.        FOOT AND ANKLE SURGERY/PODIATRY PROGRESS NOTE        ASSESSMENT: Plantar Fasciitis       TREATMENT:  -There is mild left heel pain on exam today. I recommend she continue with stretching exercises and follow-up with orthetist to adjust current inserts. I will also start her on oral prednisone.     -Questions invited and answered. She was encouraged to return to see me in 2-3 weeks if symptoms persist.     Rubén Burgos DPM  [facility] Podiatry/Foot & Ankle Surgery  [phone]      HPI: Myrna Bernstein was seen again today for left foot pain. Since her last visit, she has used orthotics in her work shoes but believes she has some irritation on her toes from the inserts.     Past Medical History:   Diagnosis Date     Anxiety      Arthritis      Depression        Past Surgical History:   Procedure Laterality Date     COLONOSCOPY N/A 3/7/2019    Procedure: COLONOSCOPY;  Surgeon: Jenny Edward DO;  Location: [facility];  Service: General       Allergies   Allergen Reactions     Erythromycin          Current Outpatient Medications:      glucosamine-chondroitinoitin 500-400 MG tablet, Take 1 tablet by mouth, Disp: , Rfl:      levothyroxine (SYNTHROID/LEVOTHROID) 25 MCG tablet, Take 1 tablet (25 mcg) by mouth daily, Disp: 90 tablet, Rfl: 1     Multiple Vitamin (MULTIVITAMIN PO), , Disp: , Rfl:      predniSONE (DELTASONE) 10 MG tablet, 40,30,20,10 mg x3 days each, Disp: 30 tablet, Rfl: 0     venlafaxine (EFFEXOR-XR) 37.5 MG 24 hr capsule, Take 1 capsule (37.5 mg) by mouth daily, Disp: 90 capsule, Rfl: 3    Family History   Problem Relation Age of Onset     Diabetes Mother      Myasthenia gravis Mother      Hypertension Mother      Hypothyroidism Father       "Hypertension Father      Dementia Father      Prostate Cancer Father      Diabetes Brother      Breast Cancer Maternal Grandmother        Social History     Socioeconomic History     Marital status:      Spouse name: Not on file     Number of children: Not on file     Years of education: Not on file     Highest education level: Not on file   Occupational History     Not on file   Tobacco Use     Smoking status: Never Smoker     Smokeless tobacco: Never Used   Substance and Sexual Activity     Alcohol use: Not on file     Drug use: Not on file     Sexual activity: Not on file   Other Topics Concern     Not on file   Social History Narrative     Not on file     Social Determinants of Health     Financial Resource Strain:      Difficulty of Paying Living Expenses:    Food Insecurity:      Worried About Running Out of Food in the Last Year:      Ran Out of Food in the Last Year:    Transportation Needs:      Lack of Transportation (Medical):      Lack of Transportation (Non-Medical):    Physical Activity:      Days of Exercise per Week:      Minutes of Exercise per Session:    Stress:      Feeling of Stress :    Social Connections:      Frequency of Communication with Friends and Family:      Frequency of Social Gatherings with Friends and Family:      Attends [employer] Services:      Active Member of Clubs or Organizations:      Attends Club or Organization Meetings:      Marital Status:    Intimate Partner Violence:      Fear of Current or Ex-Partner:      Emotionally Abused:      Physically Abused:      Sexually Abused:        10 point Review of Systems is negative except for left foot pain which is noted in HPI.     /70   Pulse 88   Ht 1.702 m (5' 7\")   Wt 88.5 kg (195 lb)   SpO2 99%   BMI 30.54 kg/m      BMI= Body mass index is 30.54 kg/m .    OBJECTIVE:  General appearance: Patient is alert and fully cooperative with history & exam.  No sign of distress is noted during the visit.    Vascular: " Dorsalis pedis and posterior tibial pulses are palpable. There is pedal hair growth left.  CFT < 3 sec from anterior tibial surface to distal digits left. There is no appreciable edema noted.  Dermatologic: Turgor and texture are within normal limits. No coloration or temperature changes. No primary or secondary lesions noted.  Neurologic: All epicritic and proprioceptive sensations are grossly intact left.  Musculoskeletal: Mild pain plantar medial left heel.     Imaging:     MA Screening Bilateral    Result Date: 7/15/2021  BILATERAL FULL FIELD DIGITAL SCREENING MAMMOGRAM WITH TOMOSYNTHESIS Performed on: 7/15/21 Compared to: 06/08/2020 MA Screen Bilateral w/[place], 02/06/2019 MA Screening Digital Bilateral, 01/13/2018 MA Screening Digital Bilateral, 07/11/2016 MA Screen Bilateral w/Candido, 02/10/2014, and 12/10/2008 Findings: The breasts are heterogeneously dense, which may obscure small masses. There is no radiographic evidence of malignancy. This study was evaluated with the assistance of Computer-Aided Detection.  Breast Tomosynthesis was used in interpretation. ACR BI-RADS Category 1: Negative RECOMMENDED FOLLOW-UP: Annual routine screening mammogram The results and recommendations of this examination will be communicated to the patient.            Again, thank you for allowing me to participate in the care of your patient.        Sincerely,        Rubén Burgos DPM

## 2021-07-26 NOTE — PROGRESS NOTES
FOOT AND ANKLE SURGERY/PODIATRY PROGRESS NOTE        ASSESSMENT: Plantar Fasciitis       TREATMENT:  -There is mild left heel pain on exam today. I recommend she continue with stretching exercises and follow-up with orthetist to adjust current inserts. I will also start her on oral prednisone.     -Questions invited and answered. She was encouraged to return to see me in 2-3 weeks if symptoms persist.     Rubén Burgos DPM  [facility] Podiatry/Foot & Ankle Surgery  [phone]      HPI: Myrna Bernstein was seen again today for left foot pain. Since her last visit, she has used orthotics in her work shoes but believes she has some irritation on her toes from the inserts.     Past Medical History:   Diagnosis Date     Anxiety      Arthritis      Depression        Past Surgical History:   Procedure Laterality Date     COLONOSCOPY N/A 3/7/2019    Procedure: COLONOSCOPY;  Surgeon: Jenny Edward DO;  Location: [facility];  Service: General       Allergies   Allergen Reactions     Erythromycin          Current Outpatient Medications:      glucosamine-chondroitinoitin 500-400 MG tablet, Take 1 tablet by mouth, Disp: , Rfl:      levothyroxine (SYNTHROID/LEVOTHROID) 25 MCG tablet, Take 1 tablet (25 mcg) by mouth daily, Disp: 90 tablet, Rfl: 1     Multiple Vitamin (MULTIVITAMIN PO), , Disp: , Rfl:      predniSONE (DELTASONE) 10 MG tablet, 40,30,20,10 mg x3 days each, Disp: 30 tablet, Rfl: 0     venlafaxine (EFFEXOR-XR) 37.5 MG 24 hr capsule, Take 1 capsule (37.5 mg) by mouth daily, Disp: 90 capsule, Rfl: 3    Family History   Problem Relation Age of Onset     Diabetes Mother      Myasthenia gravis Mother      Hypertension Mother      Hypothyroidism Father      Hypertension Father      Dementia Father      Prostate Cancer Father      Diabetes Brother      Breast Cancer Maternal Grandmother        Social History     Socioeconomic History     Marital status:      Spouse name: Not on file     Number  "of children: Not on file     Years of education: Not on file     Highest education level: Not on file   Occupational History     Not on file   Tobacco Use     Smoking status: Never Smoker     Smokeless tobacco: Never Used   Substance and Sexual Activity     Alcohol use: Not on file     Drug use: Not on file     Sexual activity: Not on file   Other Topics Concern     Not on file   Social History Narrative     Not on file     Social Determinants of Health     Financial Resource Strain:      Difficulty of Paying Living Expenses:    Food Insecurity:      Worried About Running Out of Food in the Last Year:      Ran Out of Food in the Last Year:    Transportation Needs:      Lack of Transportation (Medical):      Lack of Transportation (Non-Medical):    Physical Activity:      Days of Exercise per Week:      Minutes of Exercise per Session:    Stress:      Feeling of Stress :    Social Connections:      Frequency of Communication with Friends and Family:      Frequency of Social Gatherings with Friends and Family:      Attends Jain Services:      Active Member of Clubs or Organizations:      Attends Club or Organization Meetings:      Marital Status:    Intimate Partner Violence:      Fear of Current or Ex-Partner:      Emotionally Abused:      Physically Abused:      Sexually Abused:        10 point Review of Systems is negative except for left foot pain which is noted in HPI.     /70   Pulse 88   Ht 1.702 m (5' 7\")   Wt 88.5 kg (195 lb)   SpO2 99%   BMI 30.54 kg/m      BMI= Body mass index is 30.54 kg/m .    OBJECTIVE:  General appearance: Patient is alert and fully cooperative with history & exam.  No sign of distress is noted during the visit.    Vascular: Dorsalis pedis and posterior tibial pulses are palpable. There is pedal hair growth left.  CFT < 3 sec from anterior tibial surface to distal digits left. There is no appreciable edema noted.  Dermatologic: Turgor and texture are within normal " limits. No coloration or temperature changes. No primary or secondary lesions noted.  Neurologic: All epicritic and proprioceptive sensations are grossly intact left.  Musculoskeletal: Mild pain plantar medial left heel.     Imaging:     MA Screening Bilateral    Result Date: 7/15/2021  BILATERAL FULL FIELD DIGITAL SCREENING MAMMOGRAM WITH TOMOSYNTHESIS Performed on: 7/15/21 Compared to: 06/08/2020 MA Screen Bilateral w/Candido, 02/06/2019 MA Screening Digital Bilateral, 01/13/2018 MA Screening Digital Bilateral, 07/11/2016 MA Screen Bilateral w/Candido, 02/10/2014, and 12/10/2008 Findings: The breasts are heterogeneously dense, which may obscure small masses. There is no radiographic evidence of malignancy. This study was evaluated with the assistance of Computer-Aided Detection.  Breast Tomosynthesis was used in interpretation. ACR BI-RADS Category 1: Negative RECOMMENDED FOLLOW-UP: Annual routine screening mammogram The results and recommendations of this examination will be communicated to the patient.

## 2021-08-15 ENCOUNTER — HEALTH MAINTENANCE LETTER (OUTPATIENT)
Age: 51
End: 2021-08-15

## 2021-09-17 ENCOUNTER — LAB (OUTPATIENT)
Dept: LAB | Facility: CLINIC | Age: 51
End: 2021-09-17
Payer: COMMERCIAL

## 2021-09-17 DIAGNOSIS — E03.9 HYPOTHYROIDISM, UNSPECIFIED TYPE: ICD-10-CM

## 2021-09-17 LAB — TSH SERPL DL<=0.005 MIU/L-ACNC: 4.05 MU/L (ref 0.4–4)

## 2021-09-17 PROCEDURE — 36415 COLL VENOUS BLD VENIPUNCTURE: CPT

## 2021-09-17 PROCEDURE — 84443 ASSAY THYROID STIM HORMONE: CPT

## 2021-10-11 ENCOUNTER — HEALTH MAINTENANCE LETTER (OUTPATIENT)
Age: 51
End: 2021-10-11

## 2021-11-16 ENCOUNTER — OFFICE VISIT (OUTPATIENT)
Dept: FAMILY MEDICINE | Facility: CLINIC | Age: 51
End: 2021-11-16
Payer: COMMERCIAL

## 2021-11-16 VITALS
HEART RATE: 90 BPM | RESPIRATION RATE: 20 BRPM | SYSTOLIC BLOOD PRESSURE: 112 MMHG | TEMPERATURE: 98.8 F | DIASTOLIC BLOOD PRESSURE: 60 MMHG | OXYGEN SATURATION: 98 %

## 2021-11-16 DIAGNOSIS — J04.0 LARYNGITIS, ACUTE: ICD-10-CM

## 2021-11-16 DIAGNOSIS — H66.92 LEFT ACUTE OTITIS MEDIA: Primary | ICD-10-CM

## 2021-11-16 PROCEDURE — 99214 OFFICE O/P EST MOD 30 MIN: CPT | Performed by: PHYSICIAN ASSISTANT

## 2021-11-16 NOTE — PROGRESS NOTES
"Assessment & Plan   Left acute otitis media  Myrna is a 51 year old female with a past medical history significant for recurrent acute otitis media of the left ear with PE tubes placed who presents today with complaints of ~one week of congestion, cough, and left ear drainage. Vital signs stable. Physical exam consistent with acute otitis media of the left ear. Rapid covid-19 test performed at an outside facility negative on 11/15/21.  She is also vaccinated.  Given extensive history of recurrent otitis media, will start Augmentin today. RTC prn for any new, changing or worsening symptoms.    - amoxicillin-clavulanate (AUGMENTIN) 875-125 MG tablet; Take 1 tablet by mouth 2 times daily    Laryngitis, acute  Myrna's history significant for a recent viral infection with new loss of voice over the last several days. Vital signs stable as listed above. Physical exam without concerning findings as listed above. The most likely cause of her symptoms is acute viral laryngitis. Continue conservative treatment. If laryngitis symptoms do not improve in the next couple of weeks can consider referral to ENT at that time. Follow up as needed.      BMI:   Estimated body mass index is 30.54 kg/m  as calculated from the following:    Height as of 7/26/21: 1.702 m (5' 7\").    Weight as of 7/26/21: 88.5 kg (195 lb).     Return in about 1 week (around 11/23/2021), or if symptoms worsen or fail to improve, for In-Clinic Visit.  KAI Metcalf PA-C  [facility]    Subjective   Myrna is a 51 year old who presents for the following health issues     HPI     Acute Illness  States that she had a rapid covid test done on 11/15/2021 by [facility] in [place]. Patient states that the results were negative.    Acute illness concerns: congestion, Cough, ear drainage   Onset/Duration: one week (symptoms worsened on Saturday)  Symptoms:  Fever: no  Chills/Sweats: says that she has hot flashes all " the time   Headache (location?): YES- some from the pressure in her ear   Sinus Pressure: no  Conjunctivitis:  no  Ear Pain: YES: left  Rhinorrhea: YES  Congestion: YES  Sore Throat: no  Cough: YES-productive of yellow sputum, productive of green sputum  Wheeze: no  Decreased Appetite: YES  Nausea: no  Vomiting: no  Diarrhea: no  Dysuria/Freq.: no  Dysuria or Hematuria: no  Fatigue/Achiness: no  Sick/Strep Exposure: no  Therapies tried and outcome: tylenol     Review of Systems   See HPI       Objective    /60 (BP Location: Right arm, Patient Position: Sitting, Cuff Size: Adult Large)   Pulse 90   Temp 98.8  F (37.1  C) (Tympanic)   Resp 20   SpO2 98%   There is no height or weight on file to calculate BMI.  Physical Exam   GENERAL: healthy, alert and no distress  EYES: Eyes grossly normal to inspection, PERRL and conjunctivae and sclerae normal  HENT: normal cephalic/atraumatic, right ear: normal: no effusions, no erythema, normal landmarks, left ear: bulging membrane, mucopurulent effusion and PE tube well placed, nose and mouth without ulcers or lesions, oropharynx clear and oral mucous membranes moist  NECK: no adenopathy, no asymmetry, masses, or scars and thyroid normal to palpation  RESP: lungs clear to auscultation - no rales, rhonchi or wheezes  CV: regular rate and rhythm, normal S1 S2, no S3 or S4, no murmur, click or rub, no peripheral edema and peripheral pulses strong  MS: no gross musculoskeletal defects noted, no edema

## 2021-11-17 NOTE — PATIENT INSTRUCTIONS
Start Augmentin for your ear infection.    Use over-the-counter medicines and home remedies for laryngitis.  This can take several weeks to a month to resolve after a viral syndrome.  If for some reason you go on to develop new cough, fevers or other symptoms I recommend getting Covid tested again.    Patient Education     Laryngitis    Laryngitis is a swelling of the tissues around the vocal cords. Symptoms include a hoarse (scratchy) voice. Or your voice may be gone for a few days or longer. This may be caused by a viral illness, such as a head or chest cold. It may also be due to overuse and strain of your voice. Smoking, drinking alcohol, acid reflux, allergies, or inhaling harsh chemicals may also lead to symptoms. This condition will usually go away in 1 to 2 weeks.   Home care    Rest your voice until it recovers. Talk as little as possible. If your symptoms are severe, rest at home for a day or so.    Moist air may help your symptoms. Try breathing cool steam from a humidifier or vaporizer. Or breathe air from a steamy shower.    Drink plenty of fluids to stay well hydrated.    Don't smoke    Follow-up care  Follow up with your healthcare provider or this facility if you are not better after 1 week. If your hoarse voice lasts more than 2 weeks, you may need to see an otolaryngologist. This is a doctor who treats diseases and disorders of the ear, nose, and throat (ENT). Seeing this doctor is especially important if you have a history of alcohol or tobacco use.   When to seek medical advice  Contact your healthcare provider right away if you have any of the following:     Symptoms that get worse    Severe pain with swallowing    Trouble opening your mouth    Neck swelling, neck pain, or trouble moving your neck    Fever of 100.4 F (38. C) or higher, or as directed by your healthcare provider    Symptoms do not go away in 2 weeks  Call 911  Call 911 or seek immediate medical care if you have any of the  following:     Noisy breathing or trouble breathing    Drooling or not able to swallow    Not able to talk    Feeling dizzy or lightheaded  [employer] last reviewed this educational content on 4/1/2020 2000-2021 [employer]. All rights reserved. This information is not intended as a substitute for professional medical care. Always follow your healthcare professional's instructions.

## 2021-12-16 ENCOUNTER — TRANSFERRED RECORDS (OUTPATIENT)
Dept: HEALTH INFORMATION MANAGEMENT | Facility: CLINIC | Age: 51
End: 2021-12-16
Payer: COMMERCIAL

## 2022-01-11 ENCOUNTER — TRANSFERRED RECORDS (OUTPATIENT)
Dept: HEALTH INFORMATION MANAGEMENT | Facility: CLINIC | Age: 52
End: 2022-01-11
Payer: COMMERCIAL

## 2022-01-25 ENCOUNTER — OFFICE VISIT (OUTPATIENT)
Dept: FAMILY MEDICINE | Facility: CLINIC | Age: 52
End: 2022-01-25
Payer: COMMERCIAL

## 2022-01-25 VITALS
TEMPERATURE: 97.9 F | RESPIRATION RATE: 16 BRPM | BODY MASS INDEX: 31.25 KG/M2 | OXYGEN SATURATION: 99 % | WEIGHT: 199.13 LBS | SYSTOLIC BLOOD PRESSURE: 110 MMHG | DIASTOLIC BLOOD PRESSURE: 68 MMHG | HEART RATE: 80 BPM | HEIGHT: 67 IN

## 2022-01-25 DIAGNOSIS — M25.572 PAIN IN JOINT INVOLVING ANKLE AND FOOT, LEFT: ICD-10-CM

## 2022-01-25 DIAGNOSIS — Z00.00 HEALTHCARE MAINTENANCE: ICD-10-CM

## 2022-01-25 DIAGNOSIS — R68.82 LOW LIBIDO: ICD-10-CM

## 2022-01-25 DIAGNOSIS — D22.30 NEVUS OF FACE: ICD-10-CM

## 2022-01-25 DIAGNOSIS — E03.9 HYPOTHYROIDISM, UNSPECIFIED TYPE: ICD-10-CM

## 2022-01-25 DIAGNOSIS — Z23 HIGH PRIORITY FOR 2019-NCOV VACCINE: ICD-10-CM

## 2022-01-25 DIAGNOSIS — F41.9 ANXIETY: Primary | ICD-10-CM

## 2022-01-25 LAB
ALBUMIN SERPL-MCNC: 3.8 G/DL (ref 3.4–5)
ALP SERPL-CCNC: 106 U/L (ref 40–150)
ALT SERPL W P-5'-P-CCNC: 58 U/L (ref 0–50)
ANION GAP SERPL CALCULATED.3IONS-SCNC: 3 MMOL/L (ref 3–14)
AST SERPL W P-5'-P-CCNC: 30 U/L (ref 0–45)
BILIRUB SERPL-MCNC: 0.6 MG/DL (ref 0.2–1.3)
BUN SERPL-MCNC: 12 MG/DL (ref 7–30)
CALCIUM SERPL-MCNC: 9.4 MG/DL (ref 8.5–10.1)
CHLORIDE BLD-SCNC: 107 MMOL/L (ref 94–109)
CHOLEST SERPL-MCNC: 148 MG/DL
CO2 SERPL-SCNC: 28 MMOL/L (ref 20–32)
CREAT SERPL-MCNC: 0.74 MG/DL (ref 0.52–1.04)
ERYTHROCYTE [DISTWIDTH] IN BLOOD BY AUTOMATED COUNT: 13.1 % (ref 10–15)
FASTING STATUS PATIENT QL REPORTED: YES
GFR SERPL CREATININE-BSD FRML MDRD: >90 ML/MIN/1.73M2
GLUCOSE BLD-MCNC: 93 MG/DL (ref 70–99)
HCT VFR BLD AUTO: 42.3 % (ref 35–47)
HDLC SERPL-MCNC: 71 MG/DL
HGB BLD-MCNC: 14.1 G/DL (ref 11.7–15.7)
LDLC SERPL CALC-MCNC: 60 MG/DL
MCH RBC QN AUTO: 29.9 PG (ref 26.5–33)
MCHC RBC AUTO-ENTMCNC: 33.3 G/DL (ref 31.5–36.5)
MCV RBC AUTO: 90 FL (ref 78–100)
NONHDLC SERPL-MCNC: 77 MG/DL
PLATELET # BLD AUTO: 208 10E3/UL (ref 150–450)
POTASSIUM BLD-SCNC: 4.1 MMOL/L (ref 3.4–5.3)
PROT SERPL-MCNC: 7.3 G/DL (ref 6.8–8.8)
RBC # BLD AUTO: 4.72 10E6/UL (ref 3.8–5.2)
SODIUM SERPL-SCNC: 138 MMOL/L (ref 133–144)
TRIGL SERPL-MCNC: 87 MG/DL
TSH SERPL DL<=0.005 MIU/L-ACNC: 2.56 MU/L (ref 0.4–4)
WBC # BLD AUTO: 6.2 10E3/UL (ref 4–11)

## 2022-01-25 PROCEDURE — 85027 COMPLETE CBC AUTOMATED: CPT | Performed by: FAMILY MEDICINE

## 2022-01-25 PROCEDURE — 0054A COVID-19,PF,PFIZER (12+ YRS): CPT | Performed by: FAMILY MEDICINE

## 2022-01-25 PROCEDURE — 80053 COMPREHEN METABOLIC PANEL: CPT | Performed by: FAMILY MEDICINE

## 2022-01-25 PROCEDURE — 84443 ASSAY THYROID STIM HORMONE: CPT | Performed by: FAMILY MEDICINE

## 2022-01-25 PROCEDURE — 91305 COVID-19,PF,PFIZER (12+ YRS): CPT | Performed by: FAMILY MEDICINE

## 2022-01-25 PROCEDURE — 36415 COLL VENOUS BLD VENIPUNCTURE: CPT | Performed by: FAMILY MEDICINE

## 2022-01-25 PROCEDURE — 99214 OFFICE O/P EST MOD 30 MIN: CPT | Performed by: FAMILY MEDICINE

## 2022-01-25 PROCEDURE — 80061 LIPID PANEL: CPT | Performed by: FAMILY MEDICINE

## 2022-01-25 RX ORDER — VENLAFAXINE HYDROCHLORIDE 37.5 MG/1
37.5 CAPSULE, EXTENDED RELEASE ORAL DAILY
Qty: 90 CAPSULE | Refills: 3 | Status: SHIPPED | OUTPATIENT
Start: 2022-01-25 | End: 2023-03-21

## 2022-01-25 ASSESSMENT — MIFFLIN-ST. JEOR: SCORE: 1540.86

## 2022-01-25 NOTE — PROGRESS NOTES
Assessment/Plan:    Myrna Bernstein is a 51 year old female presenting for:    Anxiety  Refill Effexor sent to the pharmacy.  Discussed risk and benefits of the medication.  We did discuss that this could be affecting her libido but she is doing well with it for the last several years and does not wish to change.  - venlafaxine (EFFEXOR-XR) 37.5 MG 24 hr capsule  Dispense: 90 capsule; Refill: 3    Hypothyroidism, unspecified type  Recheck TSH.  Refill of levothyroxine needed once this level comes back.  - TSH    Low libido  Discussed the normalcy of libido variations.  Discussed some reading material that might be beneficial.  No pain with intercourse.  Discussed that we could wean her off of her Effexor or start Wellbutrin or BuSpar to see if this is helpful but she is not interested at this point.    High priority for 2019-nCoV vaccine  Covid booster given  - COVID-19,PF,PFIZER (12+ Yrs GRAY LABEL)    Healthcare maintenance  Labs below will be done.  I did welcome her to follow-up with me for her physical in a few months if she would like or else we could plan on seeing her later on this year or very early next year for her physical/Pap.  - Lipid panel reflex to direct LDL Fasting  - COMPREHENSIVE METABOLIC PANEL  - REVIEW OF HEALTH MAINTENANCE PROTOCOL ORDERS  - CBC with Platelets    Nevus of face  Referral to dermatology  - Adult Dermatology Referral    Pain in joint involving ankle and foot, left  Physical therapy exercises were given.      Medications Discontinued During This Encounter   Medication Reason     amoxicillin-clavulanate (AUGMENTIN) 875-125 MG tablet      levothyroxine (SYNTHROID/LEVOTHROID) 25 MCG tablet      venlafaxine (EFFEXOR-XR) 37.5 MG 24 hr capsule Reorder           Chief Complaint:  Thyroid Problem, Mass, Menopausal Sx, Musculoskeletal Problem, and Imm/Inj        Subjective:   Myrna Bernstein is a very pleasant 51-year-old female presenting to the clinic today with multiple concerns:    1.   Anxiety: Patient has a history of anxiety.  She is currently on Effexor.  She is been on that medication for quite some time.  She does well with the medication.  She feels as though her mood is good.  She changed jobs about 2 years ago and is enjoying her new employment.    2.  Hypothyroidism: Patient is due for laboratory testing for her thyroid.  She is currently on 50 mcg of levothyroxine daily and does well with the medication.    3.  Low libido: Patient states that she has not overly concerned about this but her  is a bit concerned.  She states that over the last several years her libido has decreased.  Wallowa is not painful but she just does not seem interested.    #4.  Nevus of face: Patient notes that she has a small spot on her right eyelid just under her eyebrow.  She thinks that this has grown slightly.    #5.  Ankle pain: Patient states that she has sprained her left ankle several times.  The most recent time was about a year ago.  This healed but now over the last 4 to 5 months she sometimes has pain when she is walking in high heels or has a weight on her ankle such as when she is lying in bed with her blankets on it.  No swelling.  It does not give out on her.    12 point review of systems completed and negative except for what has been described above    History   Smoking Status     Never Smoker   Smokeless Tobacco     Never Used         Current Outpatient Medications:      glucosamine-chondroitinoitin 500-400 MG tablet, Take 1 tablet by mouth, Disp: , Rfl:      levothyroxine (SYNTHROID/LEVOTHROID) 50 MCG tablet, Take 1 tablet (50 mcg) by mouth daily, Disp: 90 tablet, Rfl: 1     Multiple Vitamin (MULTIVITAMIN PO), , Disp: , Rfl:      venlafaxine (EFFEXOR-XR) 37.5 MG 24 hr capsule, Take 1 capsule (37.5 mg) by mouth daily, Disp: 90 capsule, Rfl: 3      Objective:  Vitals:    01/25/22 0806   BP: 110/68   Pulse: 80   Resp: 16   Temp: 97.9  F (36.6  C)   TempSrc: Tympanic   SpO2: 99%  "  Weight: 90.3 kg (199 lb 2 oz)   Height: 1.702 m (5' 7\")       Body mass index is 31.19 kg/m .    Vital signs reviewed and stable  General: No acute distress  Psych: Appropriate affect  HEENT: moist mucous membranes, pupils equal, round, reactive to light and accomodation, tympanic membranes are pearly grey bilaterally  Lymph: no cervical or supraclavicular lymphadenopathy  Cardiovascular: regular rate and rhythm with no murmur  Pulmonary: clear to auscultation bilaterally with no wheeze  Abdomen: soft, non tender, non distended with normo-active bowel sounds  Extremities: warm and well perfused with no edema  Skin: warm and dry with no rash, small flesh-colored nevus with potential central umbilication under her left eyebrow.         This note has been dictated and transcribed using voice recognition software.   Any errors in transcription are unintentional and inherent to the software.  "

## 2022-03-15 DIAGNOSIS — E03.9 HYPOTHYROIDISM, UNSPECIFIED TYPE: ICD-10-CM

## 2022-03-16 RX ORDER — LEVOTHYROXINE SODIUM 50 UG/1
50 TABLET ORAL DAILY
Qty: 90 TABLET | Refills: 1 | Status: SHIPPED | OUTPATIENT
Start: 2022-03-16 | End: 2022-09-19

## 2022-05-11 NOTE — ANESTHESIA CARE TRANSFER NOTE
Last vitals:   Vitals:    03/07/19 0832   BP: 118/75   Pulse: 90   Resp: 16   Temp: 36.5  C (97.7  F)   SpO2: 96%     Patient's level of consciousness is drowsy  Spontaneous respirations: yes  Maintains airway independently: yes  Dentition unchanged: yes  Oropharynx: oropharynx clear of all foreign objects    QCDR Measures:  ASA# 20 - Surgical Safety Checklist: WHO surgical safety checklist completed prior to induction    PQRS# 430 - Adult PONV Prevention: NA - Not adult patient, not GA or 3 or more risk factors NOT present  ASA# 8 - Peds PONV Prevention: NA - Not pediatric patient, not GA or 2 or more risk factors NOT present  PQRS# 424 - Julieth-op Temp Management: 4559F - At least one body temp DOCUMENTED => 35.5C or 95.9F within required timeframe  PQRS# 426 - PACU Transfer Protocol: - Transfer of care checklist used  ASA# 14 - Acute Post-op Pain: ASA14B - Patient did NOT experience pain >= 7 out of 10   Negative

## 2022-05-20 ENCOUNTER — TRANSFERRED RECORDS (OUTPATIENT)
Dept: HEALTH INFORMATION MANAGEMENT | Facility: CLINIC | Age: 52
End: 2022-05-20
Payer: COMMERCIAL

## 2022-05-22 ENCOUNTER — HEALTH MAINTENANCE LETTER (OUTPATIENT)
Age: 52
End: 2022-05-22

## 2022-08-26 ENCOUNTER — ANCILLARY PROCEDURE (OUTPATIENT)
Dept: MAMMOGRAPHY | Facility: CLINIC | Age: 52
End: 2022-08-26
Attending: FAMILY MEDICINE
Payer: COMMERCIAL

## 2022-08-26 DIAGNOSIS — Z12.31 VISIT FOR SCREENING MAMMOGRAM: ICD-10-CM

## 2022-08-26 PROCEDURE — 77067 SCR MAMMO BI INCL CAD: CPT

## 2022-09-19 ENCOUNTER — TELEPHONE (OUTPATIENT)
Dept: FAMILY MEDICINE | Facility: CLINIC | Age: 52
End: 2022-09-19

## 2022-09-19 DIAGNOSIS — E03.9 HYPOTHYROIDISM, UNSPECIFIED TYPE: ICD-10-CM

## 2022-09-19 RX ORDER — LEVOTHYROXINE SODIUM 50 UG/1
50 TABLET ORAL DAILY
Qty: 90 TABLET | Refills: 1 | Status: SHIPPED | OUTPATIENT
Start: 2022-09-19 | End: 2023-03-28

## 2022-09-19 NOTE — TELEPHONE ENCOUNTER
Reason for call:  Medication   If this is a refill request, has the caller requested the refill from the pharmacy already? Yes  Will the patient be using a [facility]? No  Name of the pharmacy and phone number for the current request: [facility] [phone]    Name of the medication requested: levothyroxine (SYNTHROID/LEVOTHROID) 50 MCG tablet    Other request: pt does not have enough meds to get through to next scheduled appointment    Phone number to reach patient:  Home number on file [phone] (home)    Best Time:  any    Can we leave a detailed message on this number?  YES    Travel screening: Not Applicable

## 2022-09-24 ENCOUNTER — HEALTH MAINTENANCE LETTER (OUTPATIENT)
Age: 52
End: 2022-09-24

## 2022-10-12 ENCOUNTER — OFFICE VISIT (OUTPATIENT)
Dept: FAMILY MEDICINE | Facility: CLINIC | Age: 52
End: 2022-10-12
Payer: COMMERCIAL

## 2022-10-12 VITALS
HEIGHT: 66 IN | OXYGEN SATURATION: 98 % | BODY MASS INDEX: 32.38 KG/M2 | RESPIRATION RATE: 12 BRPM | WEIGHT: 201.5 LBS | TEMPERATURE: 98 F | SYSTOLIC BLOOD PRESSURE: 118 MMHG | DIASTOLIC BLOOD PRESSURE: 80 MMHG | HEART RATE: 74 BPM

## 2022-10-12 DIAGNOSIS — E03.9 HYPOTHYROIDISM, UNSPECIFIED TYPE: ICD-10-CM

## 2022-10-12 DIAGNOSIS — Z12.4 CERVICAL CANCER SCREENING: ICD-10-CM

## 2022-10-12 DIAGNOSIS — Z00.00 HEALTHCARE MAINTENANCE: Primary | ICD-10-CM

## 2022-10-12 DIAGNOSIS — F41.9 ANXIETY: ICD-10-CM

## 2022-10-12 DIAGNOSIS — R68.82 LOW LIBIDO: ICD-10-CM

## 2022-10-12 LAB
ALBUMIN SERPL BCG-MCNC: 4.7 G/DL (ref 3.5–5.2)
ALP SERPL-CCNC: 114 U/L (ref 35–104)
ALT SERPL W P-5'-P-CCNC: 54 U/L (ref 10–35)
ANION GAP SERPL CALCULATED.3IONS-SCNC: 13 MMOL/L (ref 7–15)
AST SERPL W P-5'-P-CCNC: 45 U/L (ref 10–35)
BILIRUB SERPL-MCNC: 0.6 MG/DL
BUN SERPL-MCNC: 12.1 MG/DL (ref 6–20)
CALCIUM SERPL-MCNC: 9.5 MG/DL (ref 8.6–10)
CHLORIDE SERPL-SCNC: 104 MMOL/L (ref 98–107)
CHOLEST SERPL-MCNC: 162 MG/DL
CREAT SERPL-MCNC: 0.83 MG/DL (ref 0.51–0.95)
DEPRECATED HCO3 PLAS-SCNC: 24 MMOL/L (ref 22–29)
ERYTHROCYTE [DISTWIDTH] IN BLOOD BY AUTOMATED COUNT: 13.1 % (ref 10–15)
GFR SERPL CREATININE-BSD FRML MDRD: 83 ML/MIN/1.73M2
GLUCOSE SERPL-MCNC: 100 MG/DL (ref 70–99)
HCT VFR BLD AUTO: 43.1 % (ref 35–47)
HDLC SERPL-MCNC: 91 MG/DL
HGB BLD-MCNC: 14.3 G/DL (ref 11.7–15.7)
LDLC SERPL CALC-MCNC: 55 MG/DL
MCH RBC QN AUTO: 30.1 PG (ref 26.5–33)
MCHC RBC AUTO-ENTMCNC: 33.2 G/DL (ref 31.5–36.5)
MCV RBC AUTO: 91 FL (ref 78–100)
NONHDLC SERPL-MCNC: 71 MG/DL
PLATELET # BLD AUTO: 236 10E3/UL (ref 150–450)
POTASSIUM SERPL-SCNC: 4.2 MMOL/L (ref 3.4–5.3)
PROT SERPL-MCNC: 7.4 G/DL (ref 6.4–8.3)
RBC # BLD AUTO: 4.75 10E6/UL (ref 3.8–5.2)
SODIUM SERPL-SCNC: 141 MMOL/L (ref 136–145)
TRIGL SERPL-MCNC: 82 MG/DL
TSH SERPL DL<=0.005 MIU/L-ACNC: 3.35 UIU/ML (ref 0.3–4.2)
WBC # BLD AUTO: 6.7 10E3/UL (ref 4–11)

## 2022-10-12 PROCEDURE — 87624 HPV HI-RISK TYP POOLED RSLT: CPT | Performed by: FAMILY MEDICINE

## 2022-10-12 PROCEDURE — G0145 SCR C/V CYTO,THINLAYER,RESCR: HCPCS | Performed by: FAMILY MEDICINE

## 2022-10-12 PROCEDURE — 85027 COMPLETE CBC AUTOMATED: CPT | Performed by: FAMILY MEDICINE

## 2022-10-12 PROCEDURE — 99396 PREV VISIT EST AGE 40-64: CPT | Performed by: FAMILY MEDICINE

## 2022-10-12 PROCEDURE — 99213 OFFICE O/P EST LOW 20 MIN: CPT | Mod: 25 | Performed by: FAMILY MEDICINE

## 2022-10-12 PROCEDURE — 80053 COMPREHEN METABOLIC PANEL: CPT | Performed by: FAMILY MEDICINE

## 2022-10-12 PROCEDURE — 84443 ASSAY THYROID STIM HORMONE: CPT | Performed by: FAMILY MEDICINE

## 2022-10-12 PROCEDURE — 80061 LIPID PANEL: CPT | Performed by: FAMILY MEDICINE

## 2022-10-12 PROCEDURE — 36415 COLL VENOUS BLD VENIPUNCTURE: CPT | Performed by: FAMILY MEDICINE

## 2022-10-12 RX ORDER — BUPROPION HYDROCHLORIDE 150 MG/1
150 TABLET ORAL EVERY MORNING
Qty: 90 TABLET | Refills: 1 | Status: SHIPPED | OUTPATIENT
Start: 2022-10-12 | End: 2023-03-28

## 2022-10-12 ASSESSMENT — ENCOUNTER SYMPTOMS
DIARRHEA: 0
HEADACHES: 0
DIZZINESS: 0
PARESTHESIAS: 0
ABDOMINAL PAIN: 0
ARTHRALGIAS: 1
CHILLS: 0
NAUSEA: 0
MYALGIAS: 0
JOINT SWELLING: 0
PALPITATIONS: 0
HEARTBURN: 0
HEMATURIA: 0
HEMATOCHEZIA: 0
FREQUENCY: 0
EYE PAIN: 0
SHORTNESS OF BREATH: 0
WEAKNESS: 0
FEVER: 0
SORE THROAT: 0
NERVOUS/ANXIOUS: 0
COUGH: 0
DYSURIA: 0
CONSTIPATION: 0

## 2022-10-12 ASSESSMENT — PATIENT HEALTH QUESTIONNAIRE - PHQ9
SUM OF ALL RESPONSES TO PHQ QUESTIONS 1-9: 4
SUM OF ALL RESPONSES TO PHQ QUESTIONS 1-9: 4
10. IF YOU CHECKED OFF ANY PROBLEMS, HOW DIFFICULT HAVE THESE PROBLEMS MADE IT FOR YOU TO DO YOUR WORK, TAKE CARE OF THINGS AT HOME, OR GET ALONG WITH OTHER PEOPLE: SOMEWHAT DIFFICULT

## 2022-10-12 NOTE — PROGRESS NOTES
"Answers for HPI/ROS submitted by the patient on 10/12/2022  If you checked off any problems, how difficult have these problems made it for you to do your work, take care of things at home, or get along with other people?: Somewhat difficult  PHQ9 TOTAL SCORE: 4  Frequency of exercise:: 2-3 days/week  Getting at least 3 servings of Calcium per day:: Yes  Diet:: Regular (no restrictions)  Taking medications regularly:: Yes  Medication side effects:: Not applicable  Bi-annual eye exam:: Yes  Dental care twice a year:: Yes  Sleep apnea or symptoms of sleep apnea:: None  abdominal pain: No  Blood in stool: No  Blood in urine: No  chest pain: No  chills: No  congestion: No  constipation: No  cough: No  diarrhea: No  dizziness: No  ear pain: No  eye pain: No  nervous/anxious: No  fever: No  frequency: No  genital sores: No  headaches: No  hearing loss: No  heartburn: No  arthralgias: Yes  joint swelling: No  peripheral edema: No  mood changes: Yes  myalgias: No  nausea: No  dysuria: No  palpitations: No  Skin sensation changes: No  sore throat: No  urgency: No  rash: No  shortness of breath: No  visual disturbance: No  weakness: No  Additional concerns today:: No  Duration of exercise:: 15-30 minutes        Assessment/Plan:     Health maintenance female exam.  All questions answered.  Await pap smear results.  Breast self exam technique reviewed and patient encouraged to perform self-exam monthly.  Discussed healthy lifestyle modifications.  Mammogram UTD  Await fasting lab results    BMI:   Estimated body mass index is 32.52 kg/m  as calculated from the following:    Height as of this encounter: 1.676 m (5' 6\").    Weight as of this encounter: 91.4 kg (201 lb 8 oz).   Weight management plan: Patient referred to endocrine and/or weight management specialty      Healthcare maintenance  - Lipid panel reflex to direct LDL Fasting  - COMPREHENSIVE METABOLIC PANEL  - CBC with Platelets    Cervical cancer screening  - Pap Screen " "with HPV - recommended age 30 - 65 years    Anxiety  Although she feels as though her anxiety depression is well controlled with the venlafaxine she believes that this may be causing some issues with her libido.  We discussed this a bit last year.  Will add on Wellbutrin as an adjunct to see if this helps.  If she continues to do well could certainly stop her venlafaxine as she is only on a small dose.  She will reach out to me in about a month and let me know how she is doing.  - buPROPion (WELLBUTRIN XL) 150 MG 24 hr tablet  Dispense: 90 tablet; Refill: 1    Low libido  Discussed the multitude of causes.  Wellbutrin has been added as an adjunct to see if this could counteract side effects of the venlafaxine.  Certainly we could trial decreasing the venlafaxine at some point as well.  She has been reading the book that I recommended 1 year ago and is about long term through that.  Discussed also speaking with a psychologist as this can be very beneficial as well.  - buPROPion (WELLBUTRIN XL) 150 MG 24 hr tablet  Dispense: 90 tablet; Refill: 1    Hypothyroidism, unspecified type  Recheck TSH  - TSH    BMI 32.0-32.9,adult  Referral will be placed for weight management.        Patient has been advised of split billing requirements and indicates understanding: Yes      Subjective:     Myrna Bernstein is a 52 year old female who presents for an annual exam.  She is overall doing well.  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  She feels as though this is a good fit and she is enjoying her job.    She continues to have some concerns with her libido.  We discussed this last year.  I given her options to wean off of Effexor or trial adding Wellbutrin as an adjunct.  She declined both of these last year.  She did get the book that I recommended titled \"come as you are\" and has read about half of it and has found some of it to be " helpful.        Healthy Habits:   Regular Exercise: Yes  Sunscreen Use: Yes  Healthy Diet: yes  Dental Visits Regularly: yes  Seat Belt: Yes  Self Breast Exam Monthly: yes  Colonoscopy:   Prevention of Osteoporosis: yes      Immunization History   Administered Date(s) Administered     COVID-19,PF,Moderna 2021, 2021     COVID-19,PF,Pfizer 12+ Yrs ( and After) 2022     Flu, Unspecified 10/15/2015, 2018     HepA-Adult 10/08/2007, 2008     Influenza (IIV3) PF 10/19/2006, 10/08/2007, 2008, 2009, 10/22/2010, 2011, 10/04/2012, 10/16/2013, 10/13/2014     Influenza Vaccine IM > 6 months Valent IIV4 (Alfuria,Fluzone) 2019     TDAP Vaccine (Boostrix) 10/08/2007, 2018     Td (Adult), Adsorbed 1997     Zoster vaccine recombinant adjuvanted (SHINGRIX) 2019, 2019         Gynecologic History  No LMP recorded. Patient is perimenopausal.  Contraception: post menopausal status  Last Pap: . Results were: normal  Last mammogram: 22. Results were: normal      OB History    Para Term  AB Living   3 3 3 0 0 0   SAB IAB Ectopic Multiple Live Births   0 0 0 0 0      # Outcome Date GA Lbr Ang/2nd Weight Sex Delivery Anes PTL Lv   3 Term            2 Term            1 Term                Current Outpatient Medications   Medication Sig Dispense Refill     buPROPion (WELLBUTRIN XL) 150 MG 24 hr tablet Take 1 tablet (150 mg) by mouth every morning 90 tablet 1     glucosamine-chondroitinoitin 500-400 MG tablet Take 1 tablet by mouth       levothyroxine (SYNTHROID/LEVOTHROID) 50 MCG tablet Take 1 tablet (50 mcg) by mouth daily 90 tablet 1     Multiple Vitamin (MULTIVITAMIN PO)        venlafaxine (EFFEXOR-XR) 37.5 MG 24 hr capsule Take 1 capsule (37.5 mg) by mouth daily 90 capsule 3     Past Medical History:   Diagnosis Date     Anxiety      Arthritis      Depression      Past Surgical History:   Procedure Laterality Date     COLONOSCOPY N/A  "3/7/2019    Procedure: COLONOSCOPY;  Surgeon: Jenny Edward DO;  Location: [facility] OR;  Service: General     Erythromycin  Family History   Problem Relation Age of Onset     Diabetes Mother      Myasthenia gravis Mother      Hypertension Mother      Hypothyroidism Father      Hypertension Father      Dementia Father      Prostate Cancer Father      Diabetes Brother      Breast Cancer Maternal Grandmother      Social History     Socioeconomic History     Marital status:      Spouse name: Not on file     Number of children: Not on file     Years of education: Not on file     Highest education level: Not on file   Occupational History     Not on file   Tobacco Use     Smoking status: Never     Smokeless tobacco: Never   Substance and Sexual Activity     Alcohol use: Not on file     Drug use: Not on file     Sexual activity: Not on file   Other Topics Concern     Not on file   Social History Narrative     Not on file     Social Determinants of Health     Financial Resource Strain: Not on file   Food Insecurity: Not on file   Transportation Needs: Not on file   Physical Activity: Not on file   Stress: Not on file   Social Connections: Not on file   Intimate Partner Violence: Not on file   Housing Stability: Not on file       Review of Systems  12 point review of systems was completed and found to be negative except for what is been stated above.      Objective:      Vitals:    10/12/22 0745   BP: 118/80   Pulse: 74   Resp: 12   Temp: 98  F (36.7  C)   TempSrc: Tympanic   SpO2: 98%   Weight: 91.4 kg (201 lb 8 oz)   Height: 1.676 m (5' 6\")         Physical Exam:  General Appearance: Alert, cooperative, no distress, appears stated age   Head: Normocephalic, without obvious abnormality, atraumatic  Eyes: PERRL, conjunctiva/corneas clear, EOM's intact   Ears: Normal TM's and external ear canals, both ears  Neck: Supple, symmetrical, trachea midline, no adenopathy;  thyroid: not enlarged, symmetric, no " tenderness/mass/nodules  Back: Symmetric, no curvature, ROM normal,  Lungs: Clear to auscultation bilaterally, respirations unlabored  Breasts: No breast masses, tenderness, asymmetry, or nipple discharge.  Heart: Regular rate and rhythm, S1 and S2 normal, no murmur, rub, or gallop  Abdomen: Soft, non-tender, bowel sounds active all four quadrants,  no masses, no organomegaly  Pelvic:normal external female genitalia, normal appearing vaginal mucosa and cervix  Extremities: Extremities normal, atraumatic, no cyanosis or edema  Skin: Skin color, texture, turgor normal, no rashes or lesions  Lymph nodes: Cervical, supraclavicular, and axillary nodes normal and   Neurologic: Normal

## 2022-10-12 NOTE — Clinical Note
"Shelley Akhtar - this lady is interested in discussing weight management!  Would you be willing to take her on in your \"weight management\" practice??  Let me know!  Toña"

## 2022-10-14 LAB
BKR LAB AP GYN ADEQUACY: NORMAL
BKR LAB AP GYN INTERPRETATION: NORMAL
BKR LAB AP HPV REFLEX: NORMAL
BKR LAB AP PREVIOUS ABNORMAL: NORMAL
PATH REPORT.COMMENTS IMP SPEC: NORMAL
PATH REPORT.COMMENTS IMP SPEC: NORMAL
PATH REPORT.RELEVANT HX SPEC: NORMAL

## 2022-10-18 LAB
HUMAN PAPILLOMA VIRUS 16 DNA: NEGATIVE
HUMAN PAPILLOMA VIRUS 18 DNA: NEGATIVE
HUMAN PAPILLOMA VIRUS FINAL DIAGNOSIS: NORMAL
HUMAN PAPILLOMA VIRUS OTHER HR: NEGATIVE

## 2023-03-21 ENCOUNTER — OFFICE VISIT (OUTPATIENT)
Dept: FAMILY MEDICINE | Facility: CLINIC | Age: 53
End: 2023-03-21
Payer: COMMERCIAL

## 2023-03-21 VITALS
BODY MASS INDEX: 32.03 KG/M2 | SYSTOLIC BLOOD PRESSURE: 128 MMHG | OXYGEN SATURATION: 98 % | HEIGHT: 66 IN | TEMPERATURE: 98.5 F | HEART RATE: 81 BPM | WEIGHT: 199.3 LBS | DIASTOLIC BLOOD PRESSURE: 80 MMHG

## 2023-03-21 DIAGNOSIS — R20.0 NUMBNESS OF TOES: Primary | ICD-10-CM

## 2023-03-21 DIAGNOSIS — M75.102 ROTATOR CUFF SYNDROME, LEFT: ICD-10-CM

## 2023-03-21 DIAGNOSIS — Q66.70 HIGH ARCHES: ICD-10-CM

## 2023-03-21 DIAGNOSIS — M72.2 PLANTAR FASCIITIS: ICD-10-CM

## 2023-03-21 DIAGNOSIS — R20.2 PARESTHESIA OF RIGHT ARM: ICD-10-CM

## 2023-03-21 LAB
FASTING STATUS PATIENT QL REPORTED: NO
GLUCOSE SERPL-MCNC: 107 MG/DL (ref 70–99)
TSH SERPL DL<=0.005 MIU/L-ACNC: 2.59 UIU/ML (ref 0.3–4.2)

## 2023-03-21 PROCEDURE — 82607 VITAMIN B-12: CPT | Performed by: FAMILY MEDICINE

## 2023-03-21 PROCEDURE — 36415 COLL VENOUS BLD VENIPUNCTURE: CPT | Performed by: FAMILY MEDICINE

## 2023-03-21 PROCEDURE — 99214 OFFICE O/P EST MOD 30 MIN: CPT | Performed by: FAMILY MEDICINE

## 2023-03-21 PROCEDURE — 82947 ASSAY GLUCOSE BLOOD QUANT: CPT | Performed by: FAMILY MEDICINE

## 2023-03-21 PROCEDURE — 84443 ASSAY THYROID STIM HORMONE: CPT | Performed by: FAMILY MEDICINE

## 2023-03-21 NOTE — PROGRESS NOTES
Assessment & Plan     Numbness of toes  Just bilateral big toes. Doesn't sound radicular. Could be positional given high arches and history of plantar fasc, could be idiopathic. Check labs. Supportive shoes and orthotics all the time. If symptoms persist/worsen, next step is emg. The patient indicates understanding of these issues and agrees with the plan.   - Vitamin B12; Future  - TSH with free T4 reflex; Future  - Glucose; Future    Plantar fasciitis  Needs to wear shoes with orthotics all the time     High arches      Rotator cuff syndrome, left  Will start PT.   - Physical Therapy Referral; Future    Paresthesia of right arm  PT ordered. If symptoms persist, schedule cervical MRI. The patient indicates understanding of these issues and agrees with the plan.   - Physical Therapy Referral; Future      Return in about 4 weeks (around 4/18/2023) for as needed based on discussion in clinic.    Raina Vargas MD  [facility] NICOLE Norris is a 52 year old, presenting for the following health issues:  feet       History of Present Illness       Reason for visit:  Shoulder pain/arm numbness, toe numbness  Symptom onset:  More than a month  Symptoms include:  Toes feel numb, Right arm- had shooting pain from underarm to wrist, left shoulder pain, lower back pain- all connected?  Symptom intensity:  Moderate  Had these symptoms before:  Yes  Has tried/received treatment for these symptoms:  No    She eats 2-3 servings of fruits and vegetables daily.She consumes 0 sweetened beverage(s) daily.She exercises with enough effort to increase her heart rate 30 to 60 minutes per day.  She exercises with enough effort to increase her heart rate 4 days per week.   She is taking medications regularly.    History of plantar fascititis     Left worse than right  Left first three toes - off and on, worse in a.m.   Tingling not numb  Nothing in legs  Some right sided sciatica in the buttocks    Right  "armpit pain x several months   Off and on   Severe stabbing pain and traveled down to wrist     Left sided shoulder pain - worsened since she has been going to exercise class.   Sore ache in the shoulder   History of shoulder injury on the left   Feels that the arm gets cold   No weakness or numbness     Otherwise feeling normally     No tight/tense muscles  Not in joints  No rash         Review of Systems   Constitutional, HEENT, cardiovascular, pulmonary, gi and gu systems are negative, except as otherwise noted.      Objective    /80   Pulse 81   Temp 98.5  F (36.9  C) (Tympanic)   Ht 1.676 m (5' 6\")   Wt 90.4 kg (199 lb 4.8 oz)   SpO2 98%   BMI 32.17 kg/m    Body mass index is 32.17 kg/m .  Physical Exam   GENERAL: healthy, alert and no distress  EYES: Eyes grossly normal to inspection, PERRL and conjunctivae and sclerae normal  NECK: no adenopathy, no asymmetry, masses, or scars and thyroid normal to palpation  RESP: lungs clear to auscultation - no rales, rhonchi or wheezes  CV: regular rate and rhythm, normal S1 S2, no S3 or S4, no murmur, click or rub, no peripheral edema and peripheral pulses strong  MS: no gross musculoskeletal defects noted, no edema  MS: extremities normal- no gross deformities noted  NEURO: Normal strength and tone, mentation intact and speech normal  PSYCH: mentation appears normal, affect normal/bright  Bilateral ue exam - full range of motion, no loss of sensation. Normal reflexes.   Tender generally over left shoulder musculature; - no point tender areas   Neuro - normal achilles and patellar reflexes, Sensation intact. LE Strength 5/5. Normal heel and toe walking   Negative straight leg raising test.                 "

## 2023-03-22 LAB — VIT B12 SERPL-MCNC: 476 PG/ML (ref 232–1245)

## 2023-03-24 ENCOUNTER — THERAPY VISIT (OUTPATIENT)
Dept: PHYSICAL THERAPY | Facility: CLINIC | Age: 53
End: 2023-03-24
Attending: FAMILY MEDICINE
Payer: COMMERCIAL

## 2023-03-24 DIAGNOSIS — M25.512 LEFT SHOULDER PAIN: ICD-10-CM

## 2023-03-24 DIAGNOSIS — M25.511 RIGHT SHOULDER PAIN: ICD-10-CM

## 2023-03-24 DIAGNOSIS — R20.2 PARESTHESIA OF RIGHT ARM: ICD-10-CM

## 2023-03-24 DIAGNOSIS — M75.102 ROTATOR CUFF SYNDROME, LEFT: ICD-10-CM

## 2023-03-24 PROCEDURE — 97161 PT EVAL LOW COMPLEX 20 MIN: CPT | Mod: GP | Performed by: PHYSICAL THERAPIST

## 2023-03-24 PROCEDURE — 97110 THERAPEUTIC EXERCISES: CPT | Mod: GP | Performed by: PHYSICAL THERAPIST

## 2023-03-24 NOTE — PROGRESS NOTES
"Physical Therapy Initial Evaluation  Subjective:  The history is provided by the patient. No  was used.   Patient Health History  Myrna Bernstein being seen for shoulder pain.     Problem began: 2/24/2023 (per pt, started 1 mo ago but had injury 20 years ago).   Problem occurred: brought on by recent exercise    Pain is reported as 1/10 on pain scale.  General health as reported by patient is good.  Pertinent medical history includes: depression and thyroid problems.   Red flags:  None as reported by patient.  Medical allergies: other. Other medical allergies details: erythromiaein .       Current medications:  Thyroid medication, anti-depressants and other. Other medications details: lexothyroxin .                         Therapist Generated HPI Evaluation  Problem details: Pt reports today for L shoulder pain along with R arm paresthesia. 20 years ago fell down the stairs and  her L shoulder, every now and then it bothers her. Pt quit job 2 weeks ago (unrelated to shoulder pain) and has been working out at the [employer] which has flared it up, including with water workout classes with floaties when she is sweeping her arms back and forth. Sometimes her L shoulder starts to feel really cold inside and does notice tingling in the shoulder blade then down the back of her arm. Pt primarily c/o pain under medial shoulder blade. Pt also reports that she prefers to sleep on her L side but has not been able to d/t pain and numbness. Pt also reports that in her R arm, she has been getting random stabbing pains which shoot down her arm but \"goes just as quickly as it comes.\" This has happened over the years periodically then particularly in the past 3 months - now hasn't been happening for past 1.5 weeks. Of note, pt is right-handed. Pt reports that about a month ago, rotating her neck hurt in both directions. .         Type of problem:  Bilateral shoulders.      Condition occurred with:  " "Lifting.  Where condition occurred: during recreation/sport ([facility] [employer] [facility]  Patient reports pain:  Scapular area.  Pain is described as aching (right now sort of feels like there's a knife digging under shoulder blade) and is intermittent.  Pain radiates to:  Lower arm. Pain is the same all the time and worse during the day (with exercise).  Since onset symptoms are gradually improving.  Symptoms are exacerbated by lying on extremity, using arm behind back and certain positions  Relieved by: pt reports she hasn't tried much yet.    There was none improvement following previous treatment.  Work activity restrictions: not working but not d/t condition.  Barriers include:  None as reported by patient.                        Objective:  System                   Shoulder Evaluation:  ROM:  AROM:    Flexion:  Left:  WNL + mild pain    Right:  WNL    Abduction:  Left: limited to about 110deg, no pain but \"stiff\"    Right:  WNL  Adduction:   Left:  WNL  Right:  WNL  Internal Rotation:  Left:  T8    Right:  T6  External Rotation:  Left:  WNL    Right:  WNL      Elbow Flexion:  Left:  WNL    Right:  WNL  Elbow Extension:  Left:  WNL   Right:  WNL  Flexion/External Rotation:  Left:  WNL    Right:  WNL  Extension/Internal Rotation:  Left:  WNL + pain at end range    Right:  WNL    PROM:        Abduction:  Left:  Up to 120deg + pain                              Strength:    Flexion: Left:4+/5   Pain:    Right: 5/5     Pain:     Abduction:  Left: 4-/5  Weak/painful  Pain:    Right: 5/5     Pain:  Adduction:  Left: 5/5    Pain:    Right: 5/5     Pain:  Internal Rotation:  Left:5/5     Pain:    Right: 5/5     Pain:  External Rotation:   Left:5/5   Strong/painful    Pain:   Right:5/5     Pain:        Elbow Flexion:  Left:5/5     Pain:    Right:5/5     Pain:  Elbow Extension:  Left:5/5     Pain:    Right:5/5     Pain:  Stability Testing:    Left shoulder stability positive testing:  Internal Rotation  Left " "shoulder stability negative testing:  External Rotation    Special Tests:  Special tests assessed shoulder: UNTT: ulnar and median (-); biceps w/ forearm supinated (-) pain; + Donohue Jaya, + empty can   Left shoulder positive for the following special tests:  Impingement  Left shoulder negative for the following special tests:  Neural Tension    Right shoulder negative for the following special tests:Neural Tension  Palpation:  Palpation assessed shoulder: tenderness but \"feels good\" with rhomboid palpation.  Left shoulder tenderness present at:  Rhomboids  Left shoulder tenderness not present at: Biceps    Right shoulder tenderness not present at:Biceps or Rhomboids  Mobility Tests:        Glenohumeral inferior left:  Hypomobile                                             General     ROS    Assessment/Plan:    Patient is a 52 year old female with both sides shoulder complaints.    Patient has the following significant findings with corresponding treatment plan.                Diagnosis 1:  B shoulder pain (L shoulder pain/RC involvement, R arm paresthesias)  Pain -  hot/cold therapy, manual therapy, splint/taping/bracing/orthotics, self management, education and home program  Decreased ROM/flexibility - manual therapy, therapeutic exercise, therapeutic activity and home program  Decreased joint mobility - manual therapy, therapeutic exercise, therapeutic activity and home program  Decreased strength - therapeutic exercise, therapeutic activities and home program  Impaired muscle performance - biofeedback, neuro re-education and home program  Decreased function - therapeutic activities and home program  Impaired posture - neuro re-education, therapeutic activities and home program  Instability -  Therapeutic Activity  Therapeutic Exercise  Neuromuscular Re-education  Splinting/Taping/Bracing/Orthotic  home program    Therapy Evaluation Codes:   1) History comprised of:   Personal factors that impact the plan of " care:      Past/current experiences, Time since onset of symptoms and Work status.    Comorbidity factors that impact the plan of care are:      Depression.     Medications impacting care: Anti-depressant.  2) Examination of Body Systems comprised of:   Body structures and functions that impact the plan of care:      Cervical spine and Shoulder.   Activity limitations that impact the plan of care are:      Bathing, Cooking, Driving, Dressing, Grasping, Lifting, Reading/Computer work, Throwing, Sleeping and Laying down.  3) Clinical presentation characteristics are:   Stable/Uncomplicated.  4) Decision-Making    Low complexity using standardized patient assessment instrument and/or measureable assessment of functional outcome.  Cumulative Therapy Evaluation is: Low complexity.    Previous and current functional limitations:  (See Goal Flow Sheet for this information)    Short term and Long term goals: (See Goal Flow Sheet for this information)     Communication ability:  Patient appears to be able to clearly communicate and understand verbal and written communication and follow directions correctly.  Treatment Explanation - The following has been discussed with the patient:   RX ordered/plan of care  Anticipated outcomes  Possible risks and side effects  This patient would benefit from PT intervention to resume normal activities.   Rehab potential is good.    Frequency:  1 X week, once daily  Duration:  for 8 weeks  Discharge Plan:  Achieve all LTG.  Independent in home treatment program.  Reach maximal therapeutic benefit.    Please refer to the daily flowsheet for treatment today, total treatment time and time spent performing 1:1 timed codes.

## 2023-03-28 ENCOUNTER — MYC MEDICAL ADVICE (OUTPATIENT)
Dept: FAMILY MEDICINE | Facility: CLINIC | Age: 53
End: 2023-03-28
Payer: COMMERCIAL

## 2023-03-28 DIAGNOSIS — F41.9 ANXIETY: ICD-10-CM

## 2023-03-28 DIAGNOSIS — E03.9 HYPOTHYROIDISM, UNSPECIFIED TYPE: ICD-10-CM

## 2023-03-28 DIAGNOSIS — R68.82 LOW LIBIDO: ICD-10-CM

## 2023-03-28 RX ORDER — LEVOTHYROXINE SODIUM 50 UG/1
50 TABLET ORAL DAILY
Qty: 90 TABLET | Refills: 1 | Status: SHIPPED | OUTPATIENT
Start: 2023-03-28 | End: 2023-09-26

## 2023-03-28 RX ORDER — BUPROPION HYDROCHLORIDE 150 MG/1
150 TABLET ORAL EVERY MORNING
Qty: 90 TABLET | Refills: 1 | Status: SHIPPED | OUTPATIENT
Start: 2023-03-28 | End: 2023-09-26

## 2023-03-29 ENCOUNTER — THERAPY VISIT (OUTPATIENT)
Dept: PHYSICAL THERAPY | Facility: CLINIC | Age: 53
End: 2023-03-29
Attending: FAMILY MEDICINE
Payer: COMMERCIAL

## 2023-03-29 DIAGNOSIS — M25.512 LEFT SHOULDER PAIN: ICD-10-CM

## 2023-03-29 DIAGNOSIS — M25.511 RIGHT SHOULDER PAIN: Primary | ICD-10-CM

## 2023-03-29 PROCEDURE — 97140 MANUAL THERAPY 1/> REGIONS: CPT | Mod: GP | Performed by: PHYSICAL THERAPIST

## 2023-03-29 PROCEDURE — 97110 THERAPEUTIC EXERCISES: CPT | Mod: GP | Performed by: PHYSICAL THERAPIST

## 2023-03-30 ENCOUNTER — TRANSFERRED RECORDS (OUTPATIENT)
Dept: HEALTH INFORMATION MANAGEMENT | Facility: CLINIC | Age: 53
End: 2023-03-30
Payer: COMMERCIAL

## 2023-09-19 ENCOUNTER — ANCILLARY PROCEDURE (OUTPATIENT)
Dept: MAMMOGRAPHY | Facility: CLINIC | Age: 53
End: 2023-09-19
Attending: FAMILY MEDICINE
Payer: COMMERCIAL

## 2023-09-19 DIAGNOSIS — Z12.31 VISIT FOR SCREENING MAMMOGRAM: ICD-10-CM

## 2023-09-19 PROCEDURE — 77067 SCR MAMMO BI INCL CAD: CPT

## 2023-09-26 ENCOUNTER — TELEPHONE (OUTPATIENT)
Dept: FAMILY MEDICINE | Facility: CLINIC | Age: 53
End: 2023-09-26
Payer: COMMERCIAL

## 2023-09-26 DIAGNOSIS — F41.9 ANXIETY: ICD-10-CM

## 2023-09-26 DIAGNOSIS — R68.82 LOW LIBIDO: ICD-10-CM

## 2023-09-26 DIAGNOSIS — E03.9 HYPOTHYROIDISM, UNSPECIFIED TYPE: ICD-10-CM

## 2023-09-26 RX ORDER — BUPROPION HYDROCHLORIDE 150 MG/1
150 TABLET ORAL EVERY MORNING
Qty: 90 TABLET | Refills: 1 | Status: SHIPPED | OUTPATIENT
Start: 2023-09-26 | End: 2023-12-26

## 2023-09-26 RX ORDER — LEVOTHYROXINE SODIUM 50 UG/1
50 TABLET ORAL DAILY
Qty: 90 TABLET | Refills: 1 | Status: SHIPPED | OUTPATIENT
Start: 2023-09-26 | End: 2023-12-26

## 2023-10-04 PROBLEM — M25.512 LEFT SHOULDER PAIN: Status: RESOLVED | Noted: 2023-03-24 | Resolved: 2023-10-04

## 2023-10-04 PROBLEM — M25.511 RIGHT SHOULDER PAIN: Status: RESOLVED | Noted: 2023-03-24 | Resolved: 2023-10-04

## 2023-12-17 ENCOUNTER — HEALTH MAINTENANCE LETTER (OUTPATIENT)
Age: 53
End: 2023-12-17

## 2023-12-18 ENCOUNTER — TELEPHONE (OUTPATIENT)
Dept: FAMILY MEDICINE | Facility: CLINIC | Age: 53
End: 2023-12-18
Payer: COMMERCIAL

## 2023-12-18 DIAGNOSIS — E03.9 HYPOTHYROIDISM, UNSPECIFIED TYPE: Primary | ICD-10-CM

## 2023-12-18 DIAGNOSIS — Z00.00 HEALTHCARE MAINTENANCE: ICD-10-CM

## 2023-12-18 NOTE — TELEPHONE ENCOUNTER
Reason for Call:  Appointment Request    Patient requesting this type of appt:  lab orders    Requested provider:  clinic lab    Reason patient unable to be scheduled:  orders not found in chart     When does patient want to be seen/preferred time: Same day    Comments: wants orders placed in chart to complete labs    [facility] or would you prefer to receive a phone call?:   Patient would prefer a phone call   Okay to leave a detailed message?: Yes at Cell number on file:    Telephone Information:   Mobile [phone]       Call taken on 12/18/2023 at 10:02 AM by Roxanna Perez

## 2023-12-19 ASSESSMENT — ENCOUNTER SYMPTOMS
DYSURIA: 0
DIZZINESS: 0
SORE THROAT: 0
WEAKNESS: 0
FEVER: 0
BREAST MASS: 0
HEADACHES: 0
MYALGIAS: 0
NAUSEA: 0
NERVOUS/ANXIOUS: 0
JOINT SWELLING: 0
SHORTNESS OF BREATH: 0
CONSTIPATION: 0
DIARRHEA: 0
CHILLS: 0
ABDOMINAL PAIN: 0
HEMATURIA: 0
PALPITATIONS: 0
ARTHRALGIAS: 0
FREQUENCY: 0
HEARTBURN: 0
PARESTHESIAS: 1
COUGH: 0
EYE PAIN: 0
HEMATOCHEZIA: 0

## 2023-12-26 ENCOUNTER — OFFICE VISIT (OUTPATIENT)
Dept: FAMILY MEDICINE | Facility: CLINIC | Age: 53
End: 2023-12-26
Payer: COMMERCIAL

## 2023-12-26 VITALS
SYSTOLIC BLOOD PRESSURE: 110 MMHG | OXYGEN SATURATION: 99 % | BODY MASS INDEX: 32.21 KG/M2 | DIASTOLIC BLOOD PRESSURE: 72 MMHG | RESPIRATION RATE: 16 BRPM | HEIGHT: 67 IN | HEART RATE: 83 BPM | TEMPERATURE: 98 F | WEIGHT: 205.25 LBS

## 2023-12-26 DIAGNOSIS — E03.9 HYPOTHYROIDISM, UNSPECIFIED TYPE: ICD-10-CM

## 2023-12-26 DIAGNOSIS — Z00.00 ROUTINE GENERAL MEDICAL EXAMINATION AT A HEALTH CARE FACILITY: Primary | ICD-10-CM

## 2023-12-26 DIAGNOSIS — R20.0 NUMBNESS OF TOES: ICD-10-CM

## 2023-12-26 DIAGNOSIS — M25.552 HIP PAIN, LEFT: ICD-10-CM

## 2023-12-26 DIAGNOSIS — R68.82 LOW LIBIDO: ICD-10-CM

## 2023-12-26 DIAGNOSIS — F41.9 ANXIETY: ICD-10-CM

## 2023-12-26 PROCEDURE — 99396 PREV VISIT EST AGE 40-64: CPT | Performed by: FAMILY MEDICINE

## 2023-12-26 PROCEDURE — 99214 OFFICE O/P EST MOD 30 MIN: CPT | Mod: 25 | Performed by: FAMILY MEDICINE

## 2023-12-26 RX ORDER — BUPROPION HYDROCHLORIDE 150 MG/1
150 TABLET ORAL EVERY MORNING
Qty: 90 TABLET | Refills: 3 | Status: SHIPPED | OUTPATIENT
Start: 2023-12-26 | End: 2023-12-26

## 2023-12-26 RX ORDER — LEVOTHYROXINE SODIUM 50 UG/1
50 TABLET ORAL DAILY
Qty: 90 TABLET | Refills: 3 | Status: SHIPPED | OUTPATIENT
Start: 2023-12-26

## 2023-12-26 RX ORDER — BUPROPION HYDROCHLORIDE 300 MG/1
300 TABLET ORAL EVERY MORNING
Qty: 90 TABLET | Refills: 3 | Status: SHIPPED | OUTPATIENT
Start: 2023-12-26

## 2023-12-26 ASSESSMENT — ENCOUNTER SYMPTOMS
FEVER: 0
DYSURIA: 0
DIARRHEA: 0
EYE PAIN: 0
HEADACHES: 0
HEMATOCHEZIA: 0
WEAKNESS: 0
CHILLS: 0
ABDOMINAL PAIN: 0
HEARTBURN: 0
SHORTNESS OF BREATH: 0
JOINT SWELLING: 0
NAUSEA: 0
MYALGIAS: 0
SORE THROAT: 0
DIZZINESS: 0
NERVOUS/ANXIOUS: 0
BREAST MASS: 0
FREQUENCY: 0
ARTHRALGIAS: 0
CONSTIPATION: 0
PARESTHESIAS: 1
PALPITATIONS: 0
COUGH: 0
HEMATURIA: 0

## 2023-12-26 NOTE — PROGRESS NOTES
SUBJECTIVE:   Myrna is a 53 year old, presenting for the following:  Physical (Has some concerns about her back and toes today/Not fasting for labs )    Healthy Habits:     Getting at least 3 servings of Calcium per day:  Yes    Bi-annual eye exam:  Yes    Dental care twice a year:  Yes    Sleep apnea or symptoms of sleep apnea:  None    Diet:  Regular (no restrictions)    Frequency of exercise:  2-3 days/week    Duration of exercise:  45-60 minutes    Taking medications regularly:  Yes    Medication side effects:  None    Additional concerns today:  Yes    She is overall doing well.  She has a few concerns today.    .  Numbness of toes: Patient notes that originally the second toe on her left foot was a bit numb.  She now notes that all of her toes feel bit numb.  This is worse in the morning and then gets better throughout the day.  She does state that her shoe size has changed a bit and she was wondering if this could have caused it however she has been slowly changing her shoes to larger sizes and she is unsure if this is helpful.  It is not painful but just a bit numb.    Hip pain: Patient notes that she has been having bilateral hip pain worse on the left.  She notes that the pain is at the top part of her lateral hip.  She states that when it was at its most severe it felt spasming however it has improved a bit since then.  She was doing some exercises but then was getting confused when she was looking online and did not want to make her hip pain worse.  There was no trauma to the area.  No falls.    Anxiety: History of anxiety.  Currently on Wellbutrin 150 mg daily.  Feels as though this is working well.  Was previously on Lexapro however that did cause some sexual dysfunction.  Initially states that she feels as though the dose is correct however then ponders of increasing the dose of it would help.  She is tolerating it well with no side effects.          Have you ever done Advance Care Planning? (For  example, a Health Directive, POLST, or a discussion with a medical provider or your loved ones about your wishes): No, advance care planning information given to patient to review.  Patient plans to discuss their wishes with loved ones or provider.      Social History     Tobacco Use    Smoking status: Never    Smokeless tobacco: Never   Substance Use Topics    Alcohol use: Not on file             12/19/2023     7:59 PM   Alcohol Use   Prescreen: >3 drinks/day or >7 drinks/week? No     Reviewed orders with patient.  Reviewed health maintenance and updated orders accordingly - Yes  Lab work is in process    Breast Cancer Screening:    FHS-7:       7/15/2021     8:15 AM 10/12/2022     7:34 AM 9/19/2023     3:56 PM 12/19/2023     8:01 PM   Breast CA Risk Assessment (FHS-7)   Did any of your first-degree relatives have breast or ovarian cancer? No No No No   Did any of your relatives have bilateral breast cancer? No No No No   Did any man in your family have breast cancer? No No No No   Did any woman in your family have breast and ovarian cancer? No Yes No Yes   Did any woman in your family have breast cancer before age 50 y? No No No No   Do you have 2 or more relatives with breast and/or ovarian cancer? No No No No   Do you have 2 or more relatives with breast and/or bowel cancer? No No No No       Mammogram Screening: Recommended mammography every 1-2 years with patient discussion and risk factor consideration  Pertinent mammograms are reviewed under the imaging tab.    History of abnormal Pap smear: NO - age 30-65 PAP every 5 years with negative HPV co-testing recommended      Latest Ref Rng & Units 10/12/2022     7:51 AM 1/3/2018     8:43 AM 1/3/2018    12:00 AM   PAP / HPV   PAP  Negative for Intraepithelial Lesion or Malignancy (NILM)  Negative for squamous intraepithelial lesion or malignancy  Electronically signed by Caitlyn Guzman CT (ASCP) on 1/9/2018 at 11:09 AM       HPV 16 DNA Negative Negative  Negative   "   HPV 18 DNA Negative Negative  Negative     Other HR HPV Negative Negative  Negative     PAP-ABSTRACT    See Scanned Document           This result is from an external source.     Reviewed and updated as needed this visit by clinical staff    Allergies  Meds              Reviewed and updated as needed this visit by Provider                     Review of Systems   Constitutional:  Negative for chills and fever.   HENT:  Negative for congestion, ear pain, hearing loss and sore throat.    Eyes:  Negative for pain and visual disturbance.   Respiratory:  Negative for cough and shortness of breath.    Cardiovascular:  Negative for chest pain, palpitations and peripheral edema.   Gastrointestinal:  Negative for abdominal pain, constipation, diarrhea, heartburn, hematochezia and nausea.   Breasts:  Positive for tenderness. Negative for breast mass and discharge.   Genitourinary:  Negative for dysuria, frequency, genital sores, hematuria, pelvic pain, urgency, vaginal bleeding and vaginal discharge.   Musculoskeletal:  Negative for arthralgias, joint swelling and myalgias.   Skin:  Negative for rash.   Neurological:  Positive for paresthesias. Negative for dizziness, weakness and headaches.   Psychiatric/Behavioral:  Positive for mood changes. The patient is not nervous/anxious.           OBJECTIVE:   /72   Pulse 83   Temp 98  F (36.7  C) (Tympanic)   Resp 16   Ht 1.689 m (5' 6.5\")   Wt 93.1 kg (205 lb 4 oz)   LMP  (LMP Unknown)   SpO2 99%   BMI 32.63 kg/m    Physical Exam    Physical Exam:  General Appearance: Alert, cooperative, no distress, appears stated age   Head: Normocephalic, without obvious abnormality, atraumatic  Eyes: PERRL, conjunctiva/corneas clear, EOM's intact   Ears: Normal TM's and external ear canals, both ears  Nose:Nares normal, septum midline,mucosa normal, no drainage    Throat:Lips, mucosa, and tongue normal; teeth and gums normal  Neck: Supple, symmetrical, trachea midline, no " adenopathy;  thyroid: not enlarged, symmetric, no tenderness/mass/nodules  Back: Symmetric, no curvature, ROM normal,  Lungs: Clear to auscultation bilaterally, respirations unlabored  Breasts: No breast masses, tenderness, asymmetry, or nipple discharge.  Heart: Regular rate and rhythm, S1 and S2 normal, no murmur, rub, or gallop  Abdomen: Soft, non-tender, bowel sounds active all four quadrants,  no masses, no organomegaly  Extremities: Extremities normal, atraumatic, no cyanosis or edema  Skin: Skin color, texture, turgor normal, no rashes or lesions  Lymph nodes: Cervical, supraclavicular, and axillary nodes normal and   Neurologic: Normal  Foot exam: Foot exam is unremarkable.  Sensation is normal to monofilament.  Musculoskeletal: Some tenderness to palpation over the iliac crest on the left with minimal tenderness over the SI joint.  Normal range of motion in the leg.        ASSESSMENT/PLAN:   1. Routine general medical examination at a health care facility  Labs have been ordered previously.  She is going to come in tomorrow to get these drawn    2. Numbness of toes  Etiology is a bit unclear.  Would encourage her to continue switching her shoes to the correct size as this might help.  Laboratory testing below will be done along with previous thyroid lab that was ordered.  She is seeing a podiatrist in a few weeks as well  - Vitamin B12; Future  - Vitamin D Deficiency; Future    3. Hip pain, left  I think she would benefit from seeing a physical therapist.  She would like to continue doing some stretches at home and she will let me know if she would like a more formal referral    4. Hypothyroidism, unspecified type  - levothyroxine (SYNTHROID/LEVOTHROID) 50 MCG tablet; Take 1 tablet (50 mcg) by mouth daily  Dispense: 90 tablet; Refill: 3    5. Anxiety  Increase Wellbutrin to 300 mg daily  - buPROPion (WELLBUTRIN XL) 300 MG 24 hr tablet; Take 1 tablet (300 mg) by mouth every morning  Dispense: 90 tablet;  "Refill: 3    6. Low libido        Patient has been advised of split billing requirements and indicates understanding: Yes      COUNSELING:  Reviewed preventive health counseling, as reflected in patient instructions      BMI:   Estimated body mass index is 32.63 kg/m  as calculated from the following:    Height as of this encounter: 1.689 m (5' 6.5\").    Weight as of this encounter: 93.1 kg (205 lb 4 oz).   Weight management plan: Discussed healthy diet and exercise guidelines      She reports that she has never smoked. She has never used smokeless tobacco.          Carmel Schumacher MD  [facility]  "

## 2023-12-27 ENCOUNTER — LAB (OUTPATIENT)
Dept: LAB | Facility: CLINIC | Age: 53
End: 2023-12-27
Payer: COMMERCIAL

## 2023-12-27 DIAGNOSIS — Z00.00 HEALTHCARE MAINTENANCE: ICD-10-CM

## 2023-12-27 DIAGNOSIS — E03.9 HYPOTHYROIDISM, UNSPECIFIED TYPE: ICD-10-CM

## 2023-12-27 DIAGNOSIS — R20.0 NUMBNESS OF TOES: ICD-10-CM

## 2023-12-27 LAB
ALBUMIN SERPL BCG-MCNC: 4.9 G/DL (ref 3.5–5.2)
ALP SERPL-CCNC: 113 U/L (ref 40–150)
ALT SERPL W P-5'-P-CCNC: 57 U/L (ref 0–50)
ANION GAP SERPL CALCULATED.3IONS-SCNC: 12 MMOL/L (ref 7–15)
AST SERPL W P-5'-P-CCNC: 40 U/L (ref 0–45)
BILIRUB SERPL-MCNC: 0.4 MG/DL
BUN SERPL-MCNC: 12.7 MG/DL (ref 6–20)
CALCIUM SERPL-MCNC: 9.7 MG/DL (ref 8.6–10)
CHLORIDE SERPL-SCNC: 105 MMOL/L (ref 98–107)
CHOLEST SERPL-MCNC: 173 MG/DL
CREAT SERPL-MCNC: 0.92 MG/DL (ref 0.51–0.95)
DEPRECATED HCO3 PLAS-SCNC: 24 MMOL/L (ref 22–29)
EGFRCR SERPLBLD CKD-EPI 2021: 73 ML/MIN/1.73M2
ERYTHROCYTE [DISTWIDTH] IN BLOOD BY AUTOMATED COUNT: 12.4 % (ref 10–15)
FASTING STATUS PATIENT QL REPORTED: YES
GLUCOSE SERPL-MCNC: 88 MG/DL (ref 70–99)
HCT VFR BLD AUTO: 43 % (ref 35–47)
HDLC SERPL-MCNC: 81 MG/DL
HGB BLD-MCNC: 14.4 G/DL (ref 11.7–15.7)
LDLC SERPL CALC-MCNC: 68 MG/DL
MCH RBC QN AUTO: 29.9 PG (ref 26.5–33)
MCHC RBC AUTO-ENTMCNC: 33.5 G/DL (ref 31.5–36.5)
MCV RBC AUTO: 89 FL (ref 78–100)
NONHDLC SERPL-MCNC: 92 MG/DL
PLATELET # BLD AUTO: 225 10E3/UL (ref 150–450)
POTASSIUM SERPL-SCNC: 4.2 MMOL/L (ref 3.4–5.3)
PROT SERPL-MCNC: 7.7 G/DL (ref 6.4–8.3)
RBC # BLD AUTO: 4.82 10E6/UL (ref 3.8–5.2)
SODIUM SERPL-SCNC: 141 MMOL/L (ref 135–145)
TRIGL SERPL-MCNC: 120 MG/DL
TSH SERPL DL<=0.005 MIU/L-ACNC: 2.45 UIU/ML (ref 0.3–4.2)
VIT B12 SERPL-MCNC: 473 PG/ML (ref 232–1245)
VIT D+METAB SERPL-MCNC: 34 NG/ML (ref 20–50)
WBC # BLD AUTO: 7.7 10E3/UL (ref 4–11)

## 2023-12-27 PROCEDURE — 85027 COMPLETE CBC AUTOMATED: CPT

## 2023-12-27 PROCEDURE — 36415 COLL VENOUS BLD VENIPUNCTURE: CPT

## 2023-12-27 PROCEDURE — 82607 VITAMIN B-12: CPT

## 2023-12-27 PROCEDURE — 84443 ASSAY THYROID STIM HORMONE: CPT

## 2023-12-27 PROCEDURE — 80053 COMPREHEN METABOLIC PANEL: CPT

## 2023-12-27 PROCEDURE — 82306 VITAMIN D 25 HYDROXY: CPT

## 2023-12-27 PROCEDURE — 80061 LIPID PANEL: CPT

## 2024-01-01 ENCOUNTER — MYC MEDICAL ADVICE (OUTPATIENT)
Dept: FAMILY MEDICINE | Facility: CLINIC | Age: 54
End: 2024-01-01
Payer: COMMERCIAL

## 2024-01-01 DIAGNOSIS — R74.01 ELEVATED ALT MEASUREMENT: Primary | ICD-10-CM

## 2024-01-08 ENCOUNTER — OFFICE VISIT (OUTPATIENT)
Dept: PODIATRY | Facility: CLINIC | Age: 54
End: 2024-01-08
Payer: COMMERCIAL

## 2024-01-08 VITALS — HEART RATE: 84 BPM | SYSTOLIC BLOOD PRESSURE: 126 MMHG | DIASTOLIC BLOOD PRESSURE: 85 MMHG | OXYGEN SATURATION: 98 %

## 2024-01-08 DIAGNOSIS — G57.53 TARSAL TUNNEL SYNDROME OF BOTH LOWER EXTREMITIES: ICD-10-CM

## 2024-01-08 DIAGNOSIS — M21.6X2 PRONATION DEFORMITY OF BOTH FEET: Primary | ICD-10-CM

## 2024-01-08 DIAGNOSIS — M21.6X1 PRONATION DEFORMITY OF BOTH FEET: Primary | ICD-10-CM

## 2024-01-08 DIAGNOSIS — G58.9 COMPRESSION NEUROPATHY: ICD-10-CM

## 2024-01-08 PROCEDURE — 99213 OFFICE O/P EST LOW 20 MIN: CPT | Performed by: PODIATRIST

## 2024-01-08 ASSESSMENT — PAIN SCALES - GENERAL: PAINLEVEL: NO PAIN (0)

## 2024-01-08 NOTE — PATIENT INSTRUCTIONS
What are Prescription Custom Orthotics?  Custom orthotics are specially-made devices designed to support and comfort your feet. Prescription orthotics are crafted for you and no one else. They match the contours of your feet precisely and are designed for the way you move. Orthotics are only manufactured after a podiatrist has conducted a complete evaluation of your feet, ankles, and legs, so the orthotic can accommodate your unique foot structure and pathology.  Prescription orthotics are divided into two categories:  Functional orthotics are designed to control abnormal motion. They may be used to treat foot pain caused by abnormal motion; they can also be used to treat injuries such as shin splints or tendinitis. Functional orthotics are usually crafted of a semi-rigid material such as plastic or graphite.  Accommodative orthotics are softer and meant to provide additional cushioning and support. They can be used to treat diabetic foot ulcers, painful calluses on the bottom of the foot, and other uncomfortable conditions.  Podiatrists use orthotics to treat foot problems such as plantar fasciitis, bursitis, tendinitis, diabetic foot ulcers, and foot, ankle, and heel pain. Clinical research studies have shown that podiatrist-prescribed foot orthotics decrease foot pain and improve function.  Orthotics typically cost more than shoe inserts purchased in a retail store, but the additional cost is usually well worth it. Unlike shoe inserts, orthotics are molded to fit each individual foot, so you can be sure that your orthotics fit and do what they're supposed to do. Prescription orthotics are also made of top-notch materials and last many years when cared for properly. Insurance often helps pay for prescription orthotics.  What are Shoe Inserts?   You've seen them at the grocery store and at the mall. You've probably even seen them on TV and online. Shoe inserts are any kind of non-prescription foot support designed  to be worn inside a shoe. Pre-packaged, mass produced, arch supports are shoe inserts. So are the  custom-made  insoles and foot supports that you can order online or at retail stores. Unless the device has been prescribed by a doctor and crafted for your specific foot, it's a shoe insert, not a custom orthotic device--despite what the ads might say.  Shoe inserts can be very helpful for a variety of foot ailments, including flat arches and foot and leg pain. They can cushion your feet, provide comfort, and support your arches, but they can't correct biomechanical foot problems or cure long-standing foot issues.  The most common types of shoe inserts are:  Arch supports: Some people have high arches. Others have low arches or flat feet. Arch supports generally have a  bumped-up  appearance and are designed to support the foot's natural arch.   Insoles: Insoles slip into your shoe to provide extra cushioning and support. Insoles are often made of gel, foam, or plastic.   Heel liners: Heel liners, sometimes called heel pads or heel cups, provide extra cushioning in the heel region. They may be especially useful for patients who have foot pain caused by age-related thinning of the heels' natural fat pads.   Foot cushions: Do your shoes rub against your heel or your toes? Foot cushions come in many different shapes and sizes and can be used as a barrier between you and your shoe.  Choosing an Over-the-Counter Shoe Insert  Selecting a shoe insert from the wide variety of devices on the market can be overwhelming. Here are some podiatrist-tested tips to help you find the insert that best meets your needs:  Consider your health. Do you have diabetes? Problems with circulation? An over-the-counter insert may not be your best bet. Diabetes and poor circulation increase your risk of foot ulcers and infections, so schedule an appointment with a podiatrist. He or she can help you select a solution that won't cause additional  health problems.   Think about the purpose. Are you planning to run a marathon, or do you just need a little arch support in your work shoes? Look for a product that fits your planned level of activity.   Bring your shoes. For the insert to be effective, it has to fit into your shoes. So bring your sneakers, dress shoes, or work boots--whatever you plan to wear with your insert. Look for an insert that will fit the contours of your shoe.   Try them on. If all possible, slip the insert into your shoe and try it out. Walk around a little. How does it feel? Don't assume that feelings of pressure will go away with continued wear. (If you can't try the inserts at the store, ask about the store's return policy and hold on to your receipt.)    Please call one of the [place] locations below to schedule an appointment. If you received a prescription please bring it with you to your appointment. Some locations are limited to what they carry.    Office Locations    [facility]  [address] [place] [address]  Home Medical Equipment, [address]   Phone: [phone]   Orthotics and Prosthetics, [address]   Phone: [phone]    [facility]   Home Medical Equipment   [id number] [address]  Phone :[phone]  Orthotics and Prosthetics   [address], [address], [facility] [address]  Phone:[phone]          [facility] [place]  [address]  [address]   [place] [address]   Phone: [phone]    [facility]ld[facility]  [address]  [place] [address]  Phone: [phone]    [facility]  [address]  [place] [address]  Phone: [phone]  Fax: [phone]    [facility]   [address]  [place] [address]  Phone:  [phone]    [facility]  [address]  [place] [address]  Phone: [phone]    [facility]  [address]  [place] [address]  Phone: [phone]    [place]   [address] [place] [address]   Phone: [phone]    WEARING YOUR CUSTOM FOOT ORTHOTICS   Most insurance plans cover one pair of orthotics per year. You must check with your   insurance plan to see what your payment responsibility will be. Please call your   insurance company by calling the number on the back of your insurance card.   Orthotic's are non-refundable and non-returnable.   Orthotics are made of various designs. Some orthotics are covered with material that extends beyond your toes. If your orthotic is of this design, you will likely need to trim the toe end to get a proper fit. The insole from your shoe can be used as a template. Simply overlay the shoe insert on top of the custom orthotic. Align the heel end while tracing the length of the insert onto the custom orthotic. Use a large scissor to trim the toe end until you get a proper fit in the shoe.   The orthotic needs to be pushed as far back in the shoe as possible. The heel portion should not ride forward so as not to irritate your heel.   Orthotics are designed to work with socks. Excessive perspiration will shorten the life span of the orthotics. Remove the orthotic from the shoe frequently for proper drying.   The break-in period lasts for weeks. People new to orthotics will likely experience new aches and pains. The orthotic is forcing your foot into a new position. Arch, foot and leg muscle aches and fatigue are common during these weeks. Minor discomfort can be considered normal break in phenomenon. Start wearing your orthotic around your home your first day. Limited activity for one to two hours is recommended. You can increase one or two additional hours each  day provided the aches and pains are subsiding. The degree of discomfort, fatigue and problems will dictate the speed of break in. You may require multiple weeks to work up to full time use.   Do not continue wearing your orthotics if they are creating problems such as blisters or sores. Do not hesitate to call the clinic to speak with a nurse regarding orthotic   break in, fit, trimming, etc. You may also need to see the doctor if the orthotics are   simply not working out. Adjustments are sometimes made to improve orthotic   function.     Orthotics will only work in certain styles and types of shoes. Orthotics rarely work in dress shoes. Slip-ons, clogs, sandals and heels are particularly troublesome. Specially designed orthotics may be necessary for these types of shoes. Your custom orthotic was designed for activities that require appropriate walking or running shoes. Lace up athletic shoes, walking shoes or work boots should work appropriately. You may need a wider or longer shoe. Shoes with a removable  or insert work best. In general, you want to remove an insert from the shoe before placing the orthotic into the shoe. Shoes without a removable liner may not work as well.     When purchasing new shoes, bring your orthotics along to get a proper fit. Shop at stores that are familiar with orthotics.   Frequent washing of the orthotic may shorten the life span of the top cover. The top cover can be replaced but will generally last one to five years depending on use and foot perspiration.

## 2024-01-08 NOTE — Clinical Note
"    1/8/2024         RE: Myrna Bernstein  [address]  [facility] [address]        Dear Colleague,    Thank you for referring your patient, Myrna Bernstein, to the [facility]. Please see a copy of my visit note below.    FOOT AND ANKLE SURGERY/PODIATRY CONSULT NOTE         ASSESSMENT:   ***      TREATMENT:  ***        HPI: I was asked to see Myrna Bernstein today  ***.  The patient was seen in consultation at the request of *** for ***.     {PAST MEDICAL HISTORY:[id number]::\"***\"}    {SOCIAL HISTORY:[id number]::\"***\"}       Allergies   Allergen Reactions     Erythromycin           Current Outpatient Medications:      buPROPion (WELLBUTRIN XL) 300 MG 24 hr tablet, Take 1 tablet (300 mg) by mouth every morning, Disp: 90 tablet, Rfl: 3     levothyroxine (SYNTHROID/LEVOTHROID) 50 MCG tablet, Take 1 tablet (50 mcg) by mouth daily, Disp: 90 tablet, Rfl: 3     Multiple Vitamin (MULTIVITAMIN PO), , Disp: , Rfl:      Family History   Problem Relation Age of Onset     Diabetes Mother      Myasthenia gravis Mother      Hypertension Mother      Hypothyroidism Father      Hypertension Father      Dementia Father      Prostate Cancer Father      Diabetes Brother      Breast Cancer Maternal Grandmother         Social History     Socioeconomic History     Marital status:      Spouse name: Not on file     Number of children: Not on file     Years of education: Not on file     Highest education level: Not on file   Occupational History     Not on file   Tobacco Use     Smoking status: Never     Smokeless tobacco: Never   Vaping Use     Vaping Use: Never used   Substance and Sexual Activity     Alcohol use: Not on file     Drug use: Not on file     Sexual activity: Not on file   Other Topics Concern     Not on file   Social History Narrative     Not on file     Social Determinants of Health     Financial Resource Strain: Low Risk  (12/19/2023)    Financial Resource Strain      Within the past 12 months, have " "you or your family members you live with been unable to get utilities (heat, electricity) when it was really needed?: No   Food Insecurity: Low Risk  (12/19/2023)    Food Insecurity      Within the past 12 months, did you worry that your food would run out before you got money to buy more?: No      Within the past 12 months, did the food you bought just not last and you didn t have money to get more?: No   Transportation Needs: Low Risk  (12/19/2023)    Transportation Needs      Within the past 12 months, has lack of transportation kept you from medical appointments, getting your medicines, non-medical meetings or appointments, work, or from getting things that you need?: No   Physical Activity: Not on file   Stress: Not on file   Social Connections: Not on file   Interpersonal Safety: Low Risk  (12/26/2023)    Interpersonal Safety      Do you feel physically and emotionally safe where you currently live?: Yes      Within the past 12 months, have you been hit, slapped, kicked or otherwise physically hurt by someone?: No      Within the past 12 months, have you been humiliated or emotionally abused in other ways by your partner or ex-partner?: No   Housing Stability: Low Risk  (12/19/2023)    Housing Stability      Do you have housing? : Yes      Are you worried about losing your housing?: No        Review of Systems - Patient denies fever, chills, rash, wound, stiffness, limping, numbness, weakness, heart burn, blood in stool, chest pain with activity, calf pain when walking, shortness of breath with activity, chronic cough, easy bleeding/bruising, swelling of ankles, excessive thirst, fatigue, depression, anxiety.  Patient admits to ***.      OBJECTIVE:  Appearance: {appearance:384335::\"alert, well appearing, and in no distress\"}.    /85   Pulse 84   LMP  (LMP Unknown)   SpO2 98%      There is no height or weight on file to calculate BMI.     General appearance: Patient is alert and fully cooperative with " "history & exam.  No sign of distress is noted during the visit.  Psychiatric: Affect is pleasant & appropriate.  Patient appears motivated to improve health.  Respiratory: Breathing is regular & unlabored while sitting.  HEENT: Hearing is intact to spoken word.  Speech is clear.  No gross evidence of visual impairment that would impact ambulation.    Vascular: Dorsalis pedis and posterior tibial pulses are palpable. There is pedal hair growth ***.  CFT < 3 sec from anterior tibial surface to distal digits ***. There is no appreciable edema noted.  Dermatologic: Turgor and texture are within normal limits. No coloration or temperature changes. No primary or secondary lesions noted.  Neurologic: All epicritic and proprioceptive sensations are grossly intact ***.  Musculoskeletal: All active and passive ankle, subtalar, midtarsal, and 1st MPJ range of motion are grossly intact without pain or crepitus, with the exception of ***. Manual muscle strength is ***. All dorsiflexors, plantarflexors, invertors, evertors are intact ***. Tenderness present to *** on palpation. Tenderness to *** with range of motion. Calf is soft/non-tender with/without warmth/induration    Imaging:     {Imaging order/result:53035}  No images are attached to the encounter or orders placed in the encounter.     No results found.   No results found.       TREATMENT:  ***    Roland Michel; SANDRA  [facility] Foot & Ankle Surgery/Podiatry FOOT AND ANKLE SURGERY/PODIATRY CONSULT NOTE         ASSESSMENT:   ***      TREATMENT:  ***        HPI: I was asked to see Myrna Bernstein today  ***.  The patient was seen in consultation at the request of *** for ***.     {PAST MEDICAL HISTORY:[id number]::\"***\"}    {SOCIAL HISTORY:[id number]::\"***\"}       Allergies   Allergen Reactions     Erythromycin           Current Outpatient Medications:      buPROPion (WELLBUTRIN XL) 300 MG 24 hr tablet, Take 1 tablet (300 mg) by mouth every morning, Disp: 90 tablet, " Rfl: 3     levothyroxine (SYNTHROID/LEVOTHROID) 50 MCG tablet, Take 1 tablet (50 mcg) by mouth daily, Disp: 90 tablet, Rfl: 3     Multiple Vitamin (MULTIVITAMIN PO), , Disp: , Rfl:      Family History   Problem Relation Age of Onset     Diabetes Mother      Myasthenia gravis Mother      Hypertension Mother      Hypothyroidism Father      Hypertension Father      Dementia Father      Prostate Cancer Father      Diabetes Brother      Breast Cancer Maternal Grandmother         Social History     Socioeconomic History     Marital status:      Spouse name: Not on file     Number of children: Not on file     Years of education: Not on file     Highest education level: Not on file   Occupational History     Not on file   Tobacco Use     Smoking status: Never     Smokeless tobacco: Never   Vaping Use     Vaping Use: Never used   Substance and Sexual Activity     Alcohol use: Not on file     Drug use: Not on file     Sexual activity: Not on file   Other Topics Concern     Not on file   Social History Narrative     Not on file     Social Determinants of Health     Financial Resource Strain: Low Risk  (12/19/2023)    Financial Resource Strain      Within the past 12 months, have you or your family members you live with been unable to get utilities (heat, electricity) when it was really needed?: No   Food Insecurity: Low Risk  (12/19/2023)    Food Insecurity      Within the past 12 months, did you worry that your food would run out before you got money to buy more?: No      Within the past 12 months, did the food you bought just not last and you didn t have money to get more?: No   Transportation Needs: Low Risk  (12/19/2023)    Transportation Needs      Within the past 12 months, has lack of transportation kept you from medical appointments, getting your medicines, non-medical meetings or appointments, work, or from getting things that you need?: No   Physical Activity: Not on file   Stress: Not on file   Social  "Connections: Not on file   Interpersonal Safety: Low Risk  (12/26/2023)    Interpersonal Safety      Do you feel physically and emotionally safe where you currently live?: Yes      Within the past 12 months, have you been hit, slapped, kicked or otherwise physically hurt by someone?: No      Within the past 12 months, have you been humiliated or emotionally abused in other ways by your partner or ex-partner?: No   Housing Stability: Low Risk  (12/19/2023)    Housing Stability      Do you have housing? : Yes      Are you worried about losing your housing?: No        Review of Systems - Patient denies fever, chills, rash, wound, stiffness, limping, numbness, weakness, heart burn, blood in stool, chest pain with activity, calf pain when walking, shortness of breath with activity, chronic cough, easy bleeding/bruising, swelling of ankles, excessive thirst, fatigue, depression, anxiety.  Patient admits to ***.      OBJECTIVE:  Appearance: {appearance:426539::\"alert, well appearing, and in no distress\"}.    /85   Pulse 84   LMP  (LMP Unknown)   SpO2 98%      There is no height or weight on file to calculate BMI.     General appearance: Patient is alert and fully cooperative with history & exam.  No sign of distress is noted during the visit.  Psychiatric: Affect is pleasant & appropriate.  Patient appears motivated to improve health.  Respiratory: Breathing is regular & unlabored while sitting.  HEENT: Hearing is intact to spoken word.  Speech is clear.  No gross evidence of visual impairment that would impact ambulation.    Vascular: Dorsalis pedis and posterior tibial pulses are palpable. There is pedal hair growth ***.  CFT < 3 sec from anterior tibial surface to distal digits ***. There is no appreciable edema noted.  Dermatologic: Turgor and texture are within normal limits. No coloration or temperature changes. No primary or secondary lesions noted.  Neurologic: All epicritic and proprioceptive sensations " are grossly intact ***.  Musculoskeletal: All active and passive ankle, subtalar, midtarsal, and 1st MPJ range of motion are grossly intact without pain or crepitus, with the exception of ***. Manual muscle strength is ***. All dorsiflexors, plantarflexors, invertors, evertors are intact ***. Tenderness present to *** on palpation. Tenderness to *** with range of motion. Calf is soft/non-tender with/without warmth/induration    Imaging:     {Imaging order/result:[id number]}  No images are attached to the encounter or orders placed in the encounter.     No results found.   No results found.       TREATMENT:  ***    Roland Mancilla DPM  [facility] Foot & Ankle Surgery/Podiatry         Again, thank you for allowing me to participate in the care of your patient.        Sincerely,        Roland Michel DPM

## 2024-01-08 NOTE — PROGRESS NOTES
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  For the patient that I do not believe her tarsal tunnel or compression neuropathy need any specific treatment at this time.  She is to monitor her condition.  If her symptoms progress she is to return to clinic for follow-up evaluation.        HPI:Myrna Bernstein presented to the clinic today complaining of mild numbness of the first second and third toes of both feet.  She stated that it initially started on the left foot and it has now started bothering her right foot.  She has no tingling.  She has no burning pain.  She has noted temperature changes.  She denies any trauma to her feet.  She has not had any associated redness or swelling.  There are no factors which relieve the symptoms.  She does have a long history of wearing orthotics.  She is requesting a new pair of orthotics at this time.      Past Medical History:   Diagnosis Date    Anxiety     Arthritis     Depression        Social History     Socioeconomic History    Marital status:      Spouse name: Not on file    Number of children: Not on file    Years of education: Not on file    Highest education level: Not on file   Occupational History    Not on file   Tobacco Use    Smoking status: Never    Smokeless tobacco: Never   Vaping Use    Vaping Use: Never used   Substance and Sexual Activity    Alcohol use: Not on file    Drug use: Not on file    Sexual activity: Not on file   Other Topics Concern    Not on file   Social History Narrative    Not on file     Social Determinants of Health     Financial Resource Strain: Low Risk  (12/19/2023)    Financial Resource Strain     Within the past 12 months, have you or your family members you live with been  unable to get utilities (heat, electricity) when it was really needed?: No   Food Insecurity: Low Risk  (12/19/2023)    Food Insecurity     Within the past 12 months, did you worry that your food would run out before you got money to buy more?: No     Within the past 12 months, did the food you bought just not last and you didn t have money to get more?: No   Transportation Needs: Low Risk  (12/19/2023)    Transportation Needs     Within the past 12 months, has lack of transportation kept you from medical appointments, getting your medicines, non-medical meetings or appointments, work, or from getting things that you need?: No   Physical Activity: Not on file   Stress: Not on file   Social Connections: Not on file   Interpersonal Safety: Low Risk  (12/26/2023)    Interpersonal Safety     Do you feel physically and emotionally safe where you currently live?: Yes     Within the past 12 months, have you been hit, slapped, kicked or otherwise physically hurt by someone?: No     Within the past 12 months, have you been humiliated or emotionally abused in other ways by your partner or ex-partner?: No   Housing Stability: Low Risk  (12/19/2023)    Housing Stability     Do you have housing? : Yes     Are you worried about losing your housing?: No          Allergies   Allergen Reactions    Erythromycin           Current Outpatient Medications:     buPROPion (WELLBUTRIN XL) 300 MG 24 hr tablet, Take 1 tablet (300 mg) by mouth every morning, Disp: 90 tablet, Rfl: 3    levothyroxine (SYNTHROID/LEVOTHROID) 50 MCG tablet, Take 1 tablet (50 mcg) by mouth daily, Disp: 90 tablet, Rfl: 3    Multiple Vitamin (MULTIVITAMIN PO), , Disp: , Rfl:      Family History   Problem Relation Age of Onset    Diabetes Mother     Myasthenia gravis Mother     Hypertension Mother     Hypothyroidism Father     Hypertension Father     Dementia Father     Prostate Cancer Father     Diabetes Brother     Breast Cancer Maternal Grandmother         Social  History     Socioeconomic History    Marital status:      Spouse name: Not on file    Number of children: Not on file    Years of education: Not on file    Highest education level: Not on file   Occupational History    Not on file   Tobacco Use    Smoking status: Never    Smokeless tobacco: Never   Vaping Use    Vaping Use: Never used   Substance and Sexual Activity    Alcohol use: Not on file    Drug use: Not on file    Sexual activity: Not on file   Other Topics Concern    Not on file   Social History Narrative    Not on file     Social Determinants of Health     Financial Resource Strain: Low Risk  (12/19/2023)    Financial Resource Strain     Within the past 12 months, have you or your family members you live with been unable to get utilities (heat, electricity) when it was really needed?: No   Food Insecurity: Low Risk  (12/19/2023)    Food Insecurity     Within the past 12 months, did you worry that your food would run out before you got money to buy more?: No     Within the past 12 months, did the food you bought just not last and you didn t have money to get more?: No   Transportation Needs: Low Risk  (12/19/2023)    Transportation Needs     Within the past 12 months, has lack of transportation kept you from medical appointments, getting your medicines, non-medical meetings or appointments, work, or from getting things that you need?: No   Physical Activity: Not on file   Stress: Not on file   Social Connections: Not on file   Interpersonal Safety: Low Risk  (12/26/2023)    Interpersonal Safety     Do you feel physically and emotionally safe where you currently live?: Yes     Within the past 12 months, have you been hit, slapped, kicked or otherwise physically hurt by someone?: No     Within the past 12 months, have you been humiliated or emotionally abused in other ways by your partner or ex-partner?: No   Housing Stability: Low Risk  (12/19/2023)    Housing Stability     Do you have housing? : Yes      Are you worried about losing your housing?: No        Review of Systems - Patient denies fever, chills, rash, wound, stiffness, limping, numbness, weakness, heart burn, blood in stool, chest pain with activity, calf pain when walking, shortness of breath with activity, chronic cough, easy bleeding/bruising, swelling of ankles, excessive thirst, fatigue, depression, anxiety.  Patient admits to mild numbness of her toes both feet.       OBJECTIVE:  Appearance: alert, well appearing, and in no distress.    /85   Pulse 84   LMP  (LMP Unknown)   SpO2 98%      There is no height or weight on file to calculate BMI.     General appearance: Patient is alert and fully cooperative with history & exam.  No sign of distress is noted during the visit.  Psychiatric: Affect is pleasant & appropriate.  Patient appears motivated to improve health.  Respiratory: Breathing is regular & unlabored while sitting.  HEENT: Hearing is intact to spoken word.  Speech is clear.  No gross evidence of visual impairment that would impact ambulation.    Vascular: Dorsalis pedis and posterior tibial pulses are palpable. There is no pedal hair growth bilaterally.  CFT < 3 sec from anterior tibial surface to distal digits bilaterally. There is no appreciable edema noted.  Dermatologic: Turgor and texture are within normal limits. No coloration or temperature changes. No primary or secondary lesions noted.  Neurologic: All epicritic and proprioceptive sensations are grossly intact bilaterally.  There is a positive Tinel's sign when percussing the tarsal tunnel and the deep peroneal nerve bilaterally  Musculoskeletal: All active and passive ankle, subtalar, midtarsal, and 1st MPJ range of motion are grossly intact without pain or crepitus, with the exception of none. Manual muscle strength is within normal limits bilaterally. All dorsiflexors, plantarflexors, invertors, evertors are intact bilaterally.  No tenderness present to bilateral  feet or ankles on palpation.  No tenderness to bilateral feet or ankles with range of motion.  Mild flattening of the medial longitudinal arch noted bilaterally.  Calf is soft/non-tender without warmth/induration    Imaging:       No images are attached to the encounter or orders placed in the encounter.     No results found.   No results found.         Roland Mancilla DPM  [facility] Foot & Ankle Surgery/Podiatry FOOT AND ANKLE SURGERY/PODIATRY CONSULT NOTE              Allergies   Allergen Reactions    Erythromycin           Current Outpatient Medications:     buPROPion (WELLBUTRIN XL) 300 MG 24 hr tablet, Take 1 tablet (300 mg) by mouth every morning, Disp: 90 tablet, Rfl: 3    levothyroxine (SYNTHROID/LEVOTHROID) 50 MCG tablet, Take 1 tablet (50 mcg) by mouth daily, Disp: 90 tablet, Rfl: 3    Multiple Vitamin (MULTIVITAMIN PO), , Disp: , Rfl:      Family History   Problem Relation Age of Onset    Diabetes Mother     Myasthenia gravis Mother     Hypertension Mother     Hypothyroidism Father     Hypertension Father     Dementia Father     Prostate Cancer Father     Diabetes Brother     Breast Cancer Maternal Grandmother         Social History     Socioeconomic History    Marital status:      Spouse name: Not on file    Number of children: Not on file    Years of education: Not on file    Highest education level: Not on file   Occupational History    Not on file   Tobacco Use    Smoking status: Never    Smokeless tobacco: Never   Vaping Use    Vaping Use: Never used   Substance and Sexual Activity    Alcohol use: Not on file    Drug use: Not on file    Sexual activity: Not on file   Other Topics Concern    Not on file   Social History Narrative    Not on file     Social Determinants of Health     Financial Resource Strain: Low Risk  (12/19/2023)    Financial Resource Strain     Within the past 12 months, have you or your family members you live with been unable to get utilities (heat, electricity)  when it was really needed?: No   Food Insecurity: Low Risk  (12/19/2023)    Food Insecurity     Within the past 12 months, did you worry that your food would run out before you got money to buy more?: No     Within the past 12 months, did the food you bought just not last and you didn t have money to get more?: No   Transportation Needs: Low Risk  (12/19/2023)    Transportation Needs     Within the past 12 months, has lack of transportation kept you from medical appointments, getting your medicines, non-medical meetings or appointments, work, or from getting things that you need?: No   Physical Activity: Not on file   Stress: Not on file   Social Connections: Not on file   Interpersonal Safety: Low Risk  (12/26/2023)    Interpersonal Safety     Do you feel physically and emotionally safe where you currently live?: Yes     Within the past 12 months, have you been hit, slapped, kicked or otherwise physically hurt by someone?: No     Within the past 12 months, have you been humiliated or emotionally abused in other ways by your partner or ex-partner?: No   Housing Stability: Low Risk  (12/19/2023)    Housing Stability     Do you have housing? : Yes     Are you worried about losing your housing?: No        Review of Systems - Patient denies fever, chills, rash, wound, stiffness, limping, numbness, weakness, heart burn, blood in stool, chest pain with activity, calf pain when walking, shortness of breath with activity, chronic cough, easy bleeding/bruising, swelling of ankles, excessive thirst, fatigue, depression, anxiety.  Patient admits to .      OBJECTIVE:  Appearance: .    /85   Pulse 84   LMP  (LMP Unknown)   SpO2 98%      There is no height or weight on file to calculate BMI.     General appearance: Patient is alert and fully cooperative with history & exam.  No sign of distress is noted during the visit.  Psychiatric: Affect is pleasant & appropriate.  Patient appears motivated to improve  health.  Respiratory: Breathing is regular & unlabored while sitting.  HEENT: Hearing is intact to spoken word.  Speech is clear.  No gross evidence of visual impairment that would impact ambulation.        Imaging:       No images are attached to the encounter or orders placed in the encounter.     No results found.   No results found.       TREATMENT:      Roland Mancilla DPM  [facility] Foot & Ankle Surgery/Podiatry

## 2024-02-09 ENCOUNTER — TRANSFERRED RECORDS (OUTPATIENT)
Dept: HEALTH INFORMATION MANAGEMENT | Facility: CLINIC | Age: 54
End: 2024-02-09
Payer: COMMERCIAL

## 2024-05-07 ENCOUNTER — TRANSFERRED RECORDS (OUTPATIENT)
Dept: HEALTH INFORMATION MANAGEMENT | Facility: CLINIC | Age: 54
End: 2024-05-07
Payer: COMMERCIAL

## 2024-10-01 ENCOUNTER — ANCILLARY PROCEDURE (OUTPATIENT)
Dept: MAMMOGRAPHY | Facility: CLINIC | Age: 54
End: 2024-10-01
Attending: FAMILY MEDICINE
Payer: COMMERCIAL

## 2024-10-01 DIAGNOSIS — Z12.31 VISIT FOR SCREENING MAMMOGRAM: ICD-10-CM

## 2024-10-01 PROCEDURE — 77063 BREAST TOMOSYNTHESIS BI: CPT

## 2024-10-31 ENCOUNTER — TRANSFERRED RECORDS (OUTPATIENT)
Dept: HEALTH INFORMATION MANAGEMENT | Facility: CLINIC | Age: 54
End: 2024-10-31
Payer: COMMERCIAL

## 2024-12-27 ENCOUNTER — OFFICE VISIT (OUTPATIENT)
Dept: FAMILY MEDICINE | Facility: CLINIC | Age: 54
End: 2024-12-27
Payer: COMMERCIAL

## 2024-12-27 VITALS
BODY MASS INDEX: 32.04 KG/M2 | TEMPERATURE: 98.3 F | WEIGHT: 199.38 LBS | DIASTOLIC BLOOD PRESSURE: 72 MMHG | HEIGHT: 66 IN | RESPIRATION RATE: 16 BRPM | SYSTOLIC BLOOD PRESSURE: 122 MMHG | OXYGEN SATURATION: 98 % | HEART RATE: 84 BPM

## 2024-12-27 DIAGNOSIS — Z00.00 ROUTINE GENERAL MEDICAL EXAMINATION AT A HEALTH CARE FACILITY: Primary | ICD-10-CM

## 2024-12-27 DIAGNOSIS — G47.00 INSOMNIA, UNSPECIFIED TYPE: ICD-10-CM

## 2024-12-27 DIAGNOSIS — F41.9 ANXIETY: ICD-10-CM

## 2024-12-27 DIAGNOSIS — E03.9 HYPOTHYROIDISM, UNSPECIFIED TYPE: ICD-10-CM

## 2024-12-27 LAB
ANION GAP SERPL CALCULATED.3IONS-SCNC: 10 MMOL/L (ref 7–15)
BUN SERPL-MCNC: 14.4 MG/DL (ref 6–20)
CALCIUM SERPL-MCNC: 9.5 MG/DL (ref 8.8–10.4)
CHLORIDE SERPL-SCNC: 104 MMOL/L (ref 98–107)
CHOLEST SERPL-MCNC: 169 MG/DL
CREAT SERPL-MCNC: 1.03 MG/DL (ref 0.51–0.95)
EGFRCR SERPLBLD CKD-EPI 2021: 64 ML/MIN/1.73M2
ERYTHROCYTE [DISTWIDTH] IN BLOOD BY AUTOMATED COUNT: 12.3 % (ref 10–15)
EST. AVERAGE GLUCOSE BLD GHB EST-MCNC: 111 MG/DL
FASTING STATUS PATIENT QL REPORTED: YES
FASTING STATUS PATIENT QL REPORTED: YES
GLUCOSE SERPL-MCNC: 91 MG/DL (ref 70–99)
HBA1C MFR BLD: 5.5 % (ref 0–5.6)
HCO3 SERPL-SCNC: 27 MMOL/L (ref 22–29)
HCT VFR BLD AUTO: 42.3 % (ref 35–47)
HDLC SERPL-MCNC: 77 MG/DL
HGB BLD-MCNC: 14.1 G/DL (ref 11.7–15.7)
LDLC SERPL CALC-MCNC: 63 MG/DL
MCH RBC QN AUTO: 30.5 PG (ref 26.5–33)
MCHC RBC AUTO-ENTMCNC: 33.3 G/DL (ref 31.5–36.5)
MCV RBC AUTO: 91 FL (ref 78–100)
NONHDLC SERPL-MCNC: 92 MG/DL
PLATELET # BLD AUTO: 214 10E3/UL (ref 150–450)
POTASSIUM SERPL-SCNC: 4 MMOL/L (ref 3.4–5.3)
RBC # BLD AUTO: 4.63 10E6/UL (ref 3.8–5.2)
SODIUM SERPL-SCNC: 141 MMOL/L (ref 135–145)
TRIGL SERPL-MCNC: 144 MG/DL
TSH SERPL DL<=0.005 MIU/L-ACNC: 2.06 UIU/ML (ref 0.3–4.2)
WBC # BLD AUTO: 8.5 10E3/UL (ref 4–11)

## 2024-12-27 PROCEDURE — 80061 LIPID PANEL: CPT | Performed by: FAMILY MEDICINE

## 2024-12-27 PROCEDURE — 83036 HEMOGLOBIN GLYCOSYLATED A1C: CPT | Performed by: FAMILY MEDICINE

## 2024-12-27 PROCEDURE — 85027 COMPLETE CBC AUTOMATED: CPT | Performed by: FAMILY MEDICINE

## 2024-12-27 PROCEDURE — 99396 PREV VISIT EST AGE 40-64: CPT | Performed by: FAMILY MEDICINE

## 2024-12-27 PROCEDURE — 36415 COLL VENOUS BLD VENIPUNCTURE: CPT | Performed by: FAMILY MEDICINE

## 2024-12-27 PROCEDURE — 99213 OFFICE O/P EST LOW 20 MIN: CPT | Mod: 25 | Performed by: FAMILY MEDICINE

## 2024-12-27 PROCEDURE — 80048 BASIC METABOLIC PNL TOTAL CA: CPT | Performed by: FAMILY MEDICINE

## 2024-12-27 PROCEDURE — 84443 ASSAY THYROID STIM HORMONE: CPT | Performed by: FAMILY MEDICINE

## 2024-12-27 RX ORDER — LEVOTHYROXINE SODIUM 50 UG/1
50 TABLET ORAL DAILY
Qty: 90 TABLET | Refills: 3 | Status: SHIPPED | OUTPATIENT
Start: 2024-12-27

## 2024-12-27 RX ORDER — TRAZODONE HYDROCHLORIDE 50 MG/1
50-100 TABLET, FILM COATED ORAL
Qty: 30 TABLET | Refills: 5 | Status: SHIPPED | OUTPATIENT
Start: 2024-12-27

## 2024-12-27 RX ORDER — BUPROPION HYDROCHLORIDE 300 MG/1
300 TABLET ORAL EVERY MORNING
Qty: 90 TABLET | Refills: 3 | Status: SHIPPED | OUTPATIENT
Start: 2024-12-27

## 2024-12-27 SDOH — HEALTH STABILITY: PHYSICAL HEALTH: ON AVERAGE, HOW MANY MINUTES DO YOU ENGAGE IN EXERCISE AT THIS LEVEL?: 60 MIN

## 2024-12-27 SDOH — HEALTH STABILITY: PHYSICAL HEALTH: ON AVERAGE, HOW MANY DAYS PER WEEK DO YOU ENGAGE IN MODERATE TO STRENUOUS EXERCISE (LIKE A BRISK WALK)?: 3 DAYS

## 2024-12-27 ASSESSMENT — SOCIAL DETERMINANTS OF HEALTH (SDOH): HOW OFTEN DO YOU GET TOGETHER WITH FRIENDS OR RELATIVES?: TWICE A WEEK

## 2024-12-27 NOTE — PATIENT INSTRUCTIONS
Patient Education   Preventive Care Advice   This is general advice given by our system to help you stay healthy. However, your care team may have specific advice just for you. Please talk to your care team about your preventive care needs.  Nutrition  Eat 5 or more servings of fruits and vegetables each day.  Try wheat bread, brown rice and whole grain pasta (instead of white bread, rice, and pasta).  Get enough calcium and vitamin D. Check the label on foods and aim for 100% of the RDA (recommended daily allowance).  Lifestyle  Exercise at least 150 minutes each week  (30 minutes a day, 5 days a week).  Do muscle strengthening activities 2 days a week. These help control your weight and prevent disease.  No smoking.  Wear sunscreen to prevent skin cancer.  Have a dental exam and cleaning every 6 months.  Yearly exams  See your health care team every year to talk about:  Any changes in your health.  Any medicines your care team has prescribed.  Preventive care, family planning, and ways to prevent chronic diseases.  Shots (vaccines)   HPV shots (up to age 26), if you've never had them before.  Hepatitis B shots (up to age 59), if you've never had them before.  COVID-19 shot: Get this shot when it's due.  Flu shot: Get a flu shot every year.  Tetanus shot: Get a tetanus shot every 10 years.  Pneumococcal, hepatitis A, and RSV shots: Ask your care team if you need these based on your risk.  Shingles shot (for age 50 and up)  General health tests  Diabetes screening:  Starting at age 35, Get screened for diabetes at least every 3 years.  If you are younger than age 35, ask your care team if you should be screened for diabetes.  Cholesterol test: At age 39, start having a cholesterol test every 5 years, or more often if advised.  Bone density scan (DEXA): At age 50, ask your care team if you should have this scan for osteoporosis (brittle bones).  Hepatitis C: Get tested at least once in your life.  STIs (sexually  transmitted infections)  Before age 24: Ask your care team if you should be screened for STIs.  After age 24: Get screened for STIs if you're at risk. You are at risk for STIs (including HIV) if:  You are sexually active with more than one person.  You don't use condoms every time.  You or a partner was diagnosed with a sexually transmitted infection.  If you are at risk for HIV, ask about PrEP medicine to prevent HIV.  Get tested for HIV at least once in your life, whether you are at risk for HIV or not.  Cancer screening tests  Cervical cancer screening: If you have a cervix, begin getting regular cervical cancer screening tests starting at age 21.  Breast cancer scan (mammogram): If you've ever had breasts, begin having regular mammograms starting at age 40. This is a scan to check for breast cancer.  Colon cancer screening: It is important to start screening for colon cancer at age 45.  Have a colonoscopy test every 10 years (or more often if you're at risk) Or, ask your provider about stool tests like a FIT test every year or Cologuard test every 3 years.  To learn more about your testing options, visit:   .  For help making a decision, visit:   https://bit.ly/up06563.  Prostate cancer screening test: If you have a prostate, ask your care team if a prostate cancer screening test (PSA) at age 55 is right for you.  Lung cancer screening: If you are a current or former smoker ages 50 to 80, ask your care team if ongoing lung cancer screenings are right for you.  For informational purposes only. Not to replace the advice of your health care provider. Copyright   2023 [facility]. All rights reserved. Clinically reviewed by the [facility]. [employer] [id number] - REV 01/24.  Learning About Stress  What is stress?     Stress is your body's response to a hard situation. Your body can have a physical, emotional, or mental response. Stress is a fact of life for most people, and it  affects everyone differently. What causes stress for you may not be stressful for someone else.  A lot of things can cause stress. You may feel stress when you go on a job interview, take a test, or run a race. This kind of short-term stress is normal and even useful. It can help you if you need to work hard or react quickly. For example, stress can help you finish an important job on time.  Long-term stress is caused by ongoing stressful situations or events. Examples of long-term stress include long-term health problems, ongoing problems at work, or conflicts in your family. Long-term stress can harm your health.  How does stress affect your health?  When you are stressed, your body responds as though you are in danger. It makes hormones that speed up your heart, make you breathe faster, and give you a burst of energy. This is called the fight-or-flight stress response. If the stress is over quickly, your body goes back to normal and no harm is done.  But if stress happens too often or lasts too long, it can have bad effects. Long-term stress can make you more likely to get sick, and it can make symptoms of some diseases worse. If you tense up when you are stressed, you may develop neck, shoulder, or low back pain. Stress is linked to high blood pressure and heart disease.  Stress also harms your emotional health. It can make you paige, tense, or depressed. Your relationships may suffer, and you may not do well at work or school.  What can you do to manage stress?  You can try these things to help manage stress:   Do something active. Exercise or activity can help reduce stress. Walking is a great way to get started. Even everyday activities such as housecleaning or yard work can help.  Try yoga or tevin chi. These techniques combine exercise and meditation. You may need some training at first to learn them.  Do something you enjoy. For example, listen to music or go to a movie. Practice your hobby or do volunteer  "work.  Meditate. This can help you relax, because you are not worrying about what happened before or what may happen in the future.  Do guided imagery. Imagine yourself in any setting that helps you feel calm. You can use online videos, books, or a teacher to guide you.  Do breathing exercises. For example:  From a standing position, bend forward from the waist with your knees slightly bent. Let your arms dangle close to the floor.  Breathe in slowly and deeply as you return to a standing position. Roll up slowly and lift your head last.  Hold your breath for just a few seconds in the standing position.  Breathe out slowly and bend forward from the waist.  Let your feelings out. Talk, laugh, cry, and express anger when you need to. Talking with supportive friends or family, a counselor, or a madeline leader about your feelings is a healthy way to relieve stress. Avoid discussing your feelings with people who make you feel worse.  Write. It may help to write about things that are bothering you. This helps you find out how much stress you feel and what is causing it. When you know this, you can find better ways to cope.  What can you do to prevent stress?  You might try some of these things to help prevent stress:  Manage your time. This helps you find time to do the things you want and need to do.  Get enough sleep. Your body recovers from the stresses of the day while you are sleeping.  Get support. Your family, friends, and community can make a difference in how you experience stress.  Limit your news feed. Avoid or limit time on social media or news that may make you feel stressed.  Do something active. Exercise or activity can help reduce stress. Walking is a great way to get started.  Where can you learn more?  Go to https://www.MedWhat.net/patiented  Enter N032 in the search box to learn more about \"Learning About Stress.\"  Current as of: October 24, 2023  Content Version: 14.3    2024 [employer]. "   Care instructions adapted under license by your healthcare professional. If you have questions about a medical condition or this instruction, always ask your healthcare professional. [employer] disclaims any warranty or liability for your use of this information.

## 2024-12-27 NOTE — PROGRESS NOTES
"Preventive Care Visit  [facility] NICOLE Schumacher MD, Family Medicine  Dec 27, 2024      Assessment & Plan     Routine general medical examination at a health care facility  - Lipid panel reflex to direct LDL Non-fasting; Future  - HEMOGLOBIN A1C; Future  - CBC with Platelets; Future  - BASIC METABOLIC PANEL; Future  - Lipid panel reflex to direct LDL Non-fasting  - HEMOGLOBIN A1C  - CBC with Platelets  - BASIC METABOLIC PANEL    Anxiety  Refill - doing well  - buPROPion (WELLBUTRIN XL) 300 MG 24 hr tablet; Take 1 tablet (300 mg) by mouth every morning.    Hypothyroidism, unspecified type  Refill -she will wait till labs returned before picking this up.  - TSH WITH FREE T4 REFLEX; Future  - levothyroxine (SYNTHROID/LEVOTHROID) 50 MCG tablet; Take 1 tablet (50 mcg) by mouth daily.  - TSH WITH FREE T4 REFLEX    Insomnia, unspecified type  Trial of trazodone sent to the pharmacy.  Discussed risks and benefits of the medication.  Discussed that she can use this \"as needed\" and potentially could use it for a few days in a row and then might not need it anymore when she \"resets\" her sleep schedule  - traZODone (DESYREL) 50 MG tablet; Take 1-2 tablets ( mg) by mouth nightly as needed for sleep.    Patient has been advised of split billing requirements and indicates understanding: Yes        BMI  Estimated body mass index is 32.18 kg/m  as calculated from the following:    Height as of this encounter: 1.676 m (5' 6\").    Weight as of this encounter: 90.4 kg (199 lb 6 oz).       Counseling  Appropriate preventive services were addressed with this patient via screening, questionnaire, or discussion as appropriate for fall prevention, nutrition, physical activity, Tobacco-use cessation, social engagement, weight loss and cognition.  Checklist reviewing preventive services available has been given to the patient.  Reviewed patient's diet, addressing concerns and/or questions.   She is at risk " for lack of exercise and has been provided with information to increase physical activity for the benefit of her well-being.   She is at risk for psychosocial distress and has been provided with information to reduce risk.           Dilia Norris is a 54 year old, presenting for the following:  Physical (Not fasting for labs today) and Recheck Medication (Med check)         HPI  She is overall doing well.  History of anxiety for which she is taking Wellbutrin and doing well.    She has some concerns with intermittent insomnia.  States that she will go in spurts of 3 to 4 days that she does not sleep well and then it improves.  She is wondering if there is medication she could take at that time.        Health Care Directive  Patient does not have a Health Care Directive: Discussed advance care planning with patient; however, patient declined at this time.      12/27/2024   General Health   How would you rate your overall physical health? Good   Feel stress (tense, anxious, or unable to sleep) To some extent   (!) STRESS CONCERN      12/27/2024   Nutrition   Three or more servings of calcium each day? Yes   Diet: Regular (no restrictions)   How many servings of fruit and vegetables per day? (!) 2-3   How many sweetened beverages each day? 0-1         12/27/2024   Exercise   Days per week of moderate/strenous exercise 3 days   Average minutes spent exercising at this level 60 min         12/27/2024   Social Factors   Frequency of gathering with friends or relatives Twice a week   Worry food won't last until get money to buy more No   Food not last or not have enough money for food? No   Do you have housing? (Housing is defined as stable permanent housing and does not include staying ouside in a car, in a tent, in an abandoned building, in an overnight shelter, or couch-surfing.) Yes   Are you worried about losing your housing? No   Lack of transportation? No   Unable to get utilities (heat,electricity)? No          12/27/2024   Fall Risk   Fallen 2 or more times in the past year? No   Trouble with walking or balance? No          12/27/2024   Dental   Dentist two times every year? Yes         12/27/2024   TB Screening   Were you born outside of the [place]? No           Today's PHQ-2 Score:       1/8/2024     3:30 PM   PHQ-2 ( 1999 [employer])   Q1: Little interest or pleasure in doing things 0   Q2: Feeling down, depressed or hopeless 0   PHQ-2 Score 0         12/27/2024   Substance Use   Alcohol more than 3/day or more than 7/wk No   Do you use any other substances recreationally? No     Social History     Tobacco Use    Smoking status: Never    Smokeless tobacco: Never   Vaping Use    Vaping status: Never Used           10/1/2024   LAST FHS-7 RESULTS   1st degree relative breast or ovarian cancer No   Any relative bilateral breast cancer No   Any male have breast cancer No   Any ONE woman have BOTH breast AND ovarian cancer No   Any woman with breast cancer before 50yrs No   2 or more relatives with breast AND/OR ovarian cancer No   2 or more relatives with breast AND/OR bowel cancer No        Mammogram Screening - Mammogram every 1-2 years updated in Health Maintenance based on mutual decision making        12/27/2024   STI Screening   New sexual partner(s) since last STI/HIV test? No     History of abnormal Pap smear: No - age 30- 64 PAP with HPV every 5 years recommended        Latest Ref Rng & Units 10/12/2022     7:51 AM 1/3/2018     8:43 AM 1/3/2018    12:00 AM   PAP / HPV   PAP  Negative for Intraepithelial Lesion or Malignancy (NILM)  Negative for squamous intraepithelial lesion or malignancy  Electronically signed by aCitlyn Guzman CT (ASCP) on 1/9/2018 at 11:09 AM       HPV 16 DNA Negative Negative  Negative     HPV 18 DNA Negative Negative  Negative     Other HR HPV Negative Negative  Negative     PAP-ABSTRACT    See Scanned Document           This result is from an external source.     ASCVD Risk   The 10-year  "ASCVD risk score (Ethan RUDD, et al., 2019) is: 1.2%    Values used to calculate the score:      Age: 54 years      Sex: Female      Is Non- : No      Diabetic: No      Tobacco smoker: No      Systolic Blood Pressure: 122 mmHg      Is BP treated: No      HDL Cholesterol: 81 mg/dL      Total Cholesterol: 173 mg/dL           Reviewed and updated as needed this visit by Provider                    Past Medical History:   Diagnosis Date    Anxiety     Arthritis     Depression      Past Surgical History:   Procedure Laterality Date    COLONOSCOPY N/A 3/7/2019    Procedure: COLONOSCOPY;  Surgeon: Jenny Edward DO;  Location: [facility];  Service: General         Review of Systems  Constitutional, HEENT, cardiovascular, pulmonary, GI, , musculoskeletal, neuro, skin, endocrine and psych systems are negative, except as otherwise noted.     Objective    Exam  /72   Pulse 84   Temp 98.3  F (36.8  C) (Tympanic)   Resp 16   Ht 1.676 m (5' 6\")   Wt 90.4 kg (199 lb 6 oz)   LMP  (LMP Unknown)   SpO2 98%   BMI 32.18 kg/m     Estimated body mass index is 32.18 kg/m  as calculated from the following:    Height as of this encounter: 1.676 m (5' 6\").    Weight as of this encounter: 90.4 kg (199 lb 6 oz).    Physical Exam    Physical Exam:  General Appearance: Alert, cooperative, no distress, appears stated age   Head: Normocephalic, without obvious abnormality, atraumatic  Eyes: PERRL, conjunctiva/corneas clear, EOM's intact   Ears: Normal TM's and external ear canals, both ears  Nose:Nares normal, septum midline,mucosa normal, no drainage    Throat:Lips, mucosa, and tongue normal; teeth and gums normal  Neck: Supple, symmetrical, trachea midline, no adenopathy;  thyroid: not enlarged, symmetric, no tenderness/mass/nodules  Back: Symmetric, no curvature, ROM normal,  Lungs: Clear to auscultation bilaterally, respirations unlabored  Breasts: No breast masses, tenderness, asymmetry, or " nipple discharge.  Heart: Regular rate and rhythm, S1 and S2 normal, no murmur, rub, or gallop  Abdomen: Soft, non-tender, bowel sounds active all four quadrants,  no masses, no organomegaly  Extremities: Extremities normal, atraumatic, no cyanosis or edema  Skin: Skin color, texture, turgor normal, no rashes or lesions  Lymph nodes: Cervical, supraclavicular, and axillary nodes normal and   Neurologic: Normal          Signed Electronically by: Carmel Schumacher MD